# Patient Record
Sex: FEMALE | Race: WHITE | NOT HISPANIC OR LATINO | URBAN - METROPOLITAN AREA
[De-identification: names, ages, dates, MRNs, and addresses within clinical notes are randomized per-mention and may not be internally consistent; named-entity substitution may affect disease eponyms.]

---

## 2017-05-30 ENCOUNTER — HISTORICAL (OUTPATIENT)
Dept: LAB | Facility: HOSPITAL | Age: 47
End: 2017-05-30

## 2017-05-30 LAB
ABS NEUT (OLG): 4.3 X10(3)/MCL
ALBUMIN SERPL-MCNC: 3.8 GM/DL (ref 3.4–5)
ALBUMIN/GLOB SERPL: 1 {RATIO}
ALP SERPL-CCNC: 58 UNIT/L (ref 45–117)
ALT SERPL-CCNC: 36 UNIT/L (ref 13–56)
APTT PPP: 23 SECOND(S) (ref 21–30)
AST SERPL-CCNC: 16 UNIT/L (ref 15–37)
BASOPHILS # BLD AUTO: 0 X10(3)/MCL
BASOPHILS NFR BLD AUTO: 0.1 % (ref 0–1)
BILIRUB SERPL-MCNC: 0.3 MG/DL (ref 0.2–1)
BILIRUBIN DIRECT+TOT PNL SERPL-MCNC: <0.1 MG/DL (ref 0–0.2)
BILIRUBIN DIRECT+TOT PNL SERPL-MCNC: >0.2 MG/DL (ref 0–1)
BUN SERPL-MCNC: 12 MG/DL (ref 7–18)
CALCIUM SERPL-MCNC: 8.3 MG/DL (ref 8.5–10.1)
CHLORIDE SERPL-SCNC: 106 MMOL/L (ref 98–107)
CHOLEST SERPL-MCNC: 224 MG/DL (ref 0–200)
CHOLEST/HDLC SERPL: 4.1 {RATIO} (ref 0–4)
CO2 SERPL-SCNC: 27 MMOL/L (ref 21–32)
CREAT SERPL-MCNC: 0.52 MG/DL (ref 0.55–1.02)
EOSINOPHIL # BLD AUTO: 0.4 X10(3)/MCL
EOSINOPHIL NFR BLD AUTO: 5.3 % (ref 0–6.4)
ERYTHROCYTE [DISTWIDTH] IN BLOOD BY AUTOMATED COUNT: 13.8 % (ref 11.5–14.8)
EST. AVERAGE GLUCOSE BLD GHB EST-MCNC: 117 MG/DL
GLOBULIN SER-MCNC: 3 GM/DL (ref 2–4)
GLUCOSE SERPL-MCNC: 94 MG/DL (ref 74–106)
HBA1C MFR BLD: 5.7 % (ref 4.2–6.3)
HCT VFR BLD AUTO: 40.3 % (ref 35.5–44.6)
HDLC SERPL-MCNC: 55 MG/DL (ref 35–60)
HGB BLD-MCNC: 13.6 GM/DL (ref 12.1–15.4)
INR PPP: 0.9
LDLC SERPL CALC-MCNC: 128 MG/DL (ref 0–129)
LYMPHOCYTES # BLD AUTO: 2.5 X10(3)/MCL
LYMPHOCYTES NFR BLD AUTO: 32.2 % (ref 16–44)
MCH RBC QN AUTO: 30.4 PG (ref 28.5–33.8)
MCHC RBC AUTO-ENTMCNC: 33.7 % (ref 33–37)
MCV RBC AUTO: 90 FL (ref 82–99)
MONOCYTES # BLD AUTO: 0.5 X10(3)/MCL
MONOCYTES NFR BLD AUTO: 6.2 % (ref 4–12.1)
NEUTROPHILS # BLD AUTO: 4.3 X10(3)/MCL
NEUTROPHILS NFR BLD AUTO: 55.9 % (ref 43–73)
PLATELET # BLD AUTO: 282 X10(3)/MCL (ref 136–369)
PMV BLD AUTO: 9.5 FL (ref 7.4–10.4)
POTASSIUM SERPL-SCNC: 4.7 MMOL/L (ref 3.5–5.1)
PROT SERPL-MCNC: 7.1 GM/DL (ref 6.4–8.2)
PROTHROMBIN TIME: 9.6 SECOND(S) (ref 9.8–12)
RBC # BLD AUTO: 4.48 X10(6)/MCL (ref 4–5.5)
SODIUM SERPL-SCNC: 140 MMOL/L (ref 136–145)
TRIGL SERPL-MCNC: 203 MG/DL (ref 30–150)
VLDLC SERPL CALC-MCNC: 41 MG/DL
WBC # SPEC AUTO: 7.7 X10(3)/MCL (ref 4–10)

## 2018-01-31 ENCOUNTER — HISTORICAL (OUTPATIENT)
Dept: LAB | Facility: HOSPITAL | Age: 48
End: 2018-01-31

## 2018-01-31 LAB
ALBUMIN SERPL-MCNC: 4.1 GM/DL (ref 3.4–5)
ALBUMIN/GLOB SERPL: 1.2 {RATIO}
ALP SERPL-CCNC: 54 UNIT/L (ref 38–126)
ALT SERPL-CCNC: 38 UNIT/L (ref 12–78)
AST SERPL-CCNC: 17 UNIT/L (ref 15–37)
BILIRUB SERPL-MCNC: 0.6 MG/DL (ref 0.2–1)
BILIRUBIN DIRECT+TOT PNL SERPL-MCNC: 0.1 MG/DL (ref 0–0.2)
BILIRUBIN DIRECT+TOT PNL SERPL-MCNC: 0.5 MG/DL (ref 0–0.8)
BUN SERPL-MCNC: 13 MG/DL (ref 7–18)
CALCIUM SERPL-MCNC: 9.7 MG/DL (ref 8.5–10.1)
CHLORIDE SERPL-SCNC: 100 MMOL/L (ref 98–107)
CHOLEST SERPL-MCNC: 234 MG/DL (ref 0–200)
CHOLEST/HDLC SERPL: 5.1 {RATIO} (ref 0–4)
CO2 SERPL-SCNC: 30 MMOL/L (ref 21–32)
CREAT SERPL-MCNC: 0.53 MG/DL (ref 0.55–1.02)
EST. AVERAGE GLUCOSE BLD GHB EST-MCNC: 114 MG/DL
GLOBULIN SER-MCNC: 3.4 GM/DL (ref 2.4–3.5)
GLUCOSE SERPL-MCNC: 99 MG/DL (ref 74–106)
HBA1C MFR BLD: 5.6 % (ref 4.2–6.3)
HDLC SERPL-MCNC: 46 MG/DL (ref 35–60)
LDLC SERPL CALC-MCNC: 150 MG/DL (ref 0–129)
POTASSIUM SERPL-SCNC: 4.1 MMOL/L (ref 3.5–5.1)
PROT SERPL-MCNC: 7.5 GM/DL (ref 6.4–8.2)
SODIUM SERPL-SCNC: 138 MMOL/L (ref 136–145)
TRIGL SERPL-MCNC: 188 MG/DL (ref 30–150)
VLDLC SERPL CALC-MCNC: 38 MG/DL

## 2018-02-19 LAB
INFLUENZA A ANTIGEN, POC: NEGATIVE
INFLUENZA B ANTIGEN, POC: NEGATIVE

## 2018-03-22 LAB
HUMAN PAPILLOMAVIRUS (HPV): NORMAL
PAP RECOMMENDATION EXT: ABNORMAL
PAP SMEAR: ABNORMAL

## 2018-04-30 ENCOUNTER — HISTORICAL (OUTPATIENT)
Dept: LAB | Facility: HOSPITAL | Age: 48
End: 2018-04-30

## 2018-04-30 LAB
ABS NEUT (OLG): 5.48 X10(3)/MCL (ref 2.1–9.2)
ALBUMIN SERPL-MCNC: 4.4 GM/DL (ref 3.4–5)
ALBUMIN/GLOB SERPL: 1.3 RATIO (ref 1.1–2)
ALP SERPL-CCNC: 90 UNIT/L (ref 38–126)
ALT SERPL-CCNC: 38 UNIT/L (ref 12–78)
AST SERPL-CCNC: 20 UNIT/L (ref 15–37)
BASOPHILS # BLD AUTO: 0 X10(3)/MCL (ref 0–0.2)
BASOPHILS NFR BLD AUTO: 0 %
BILIRUB SERPL-MCNC: 0.8 MG/DL (ref 0.2–1)
BILIRUBIN DIRECT+TOT PNL SERPL-MCNC: 0.1 MG/DL (ref 0–0.5)
BILIRUBIN DIRECT+TOT PNL SERPL-MCNC: 0.7 MG/DL (ref 0–0.8)
BUN SERPL-MCNC: 16 MG/DL (ref 7–18)
CALCIUM SERPL-MCNC: 10 MG/DL (ref 8.5–10.1)
CHLORIDE SERPL-SCNC: 98 MMOL/L (ref 98–107)
CHOLEST SERPL-MCNC: 223 MG/DL (ref 0–200)
CHOLEST/HDLC SERPL: 5.3 {RATIO} (ref 0–4)
CO2 SERPL-SCNC: 25 MMOL/L (ref 21–32)
CREAT SERPL-MCNC: 0.52 MG/DL (ref 0.55–1.02)
EOSINOPHIL # BLD AUTO: 0.2 X10(3)/MCL (ref 0–0.9)
EOSINOPHIL NFR BLD AUTO: 2 %
ERYTHROCYTE [DISTWIDTH] IN BLOOD BY AUTOMATED COUNT: 13.4 % (ref 11.5–17)
GLOBULIN SER-MCNC: 3.5 GM/DL (ref 2.4–3.5)
GLUCOSE SERPL-MCNC: 96 MG/DL (ref 74–106)
HCT VFR BLD AUTO: 38.3 % (ref 37–47)
HDLC SERPL-MCNC: 42 MG/DL (ref 35–60)
HGB BLD-MCNC: 12.7 GM/DL (ref 12–16)
LDLC SERPL CALC-MCNC: 111 MG/DL (ref 0–129)
LYMPHOCYTES # BLD AUTO: 2.4 X10(3)/MCL (ref 0.6–4.6)
LYMPHOCYTES NFR BLD AUTO: 28 %
MCH RBC QN AUTO: 30 PG (ref 27–31)
MCHC RBC AUTO-ENTMCNC: 33.2 GM/DL (ref 33–36)
MCV RBC AUTO: 90.3 FL (ref 80–94)
MONOCYTES # BLD AUTO: 0.6 X10(3)/MCL (ref 0.1–1.3)
MONOCYTES NFR BLD AUTO: 6 %
NEUTROPHILS # BLD AUTO: 5.48 X10(3)/MCL (ref 1.4–7.9)
NEUTROPHILS NFR BLD AUTO: 63 %
PLATELET # BLD AUTO: 454 X10(3)/MCL (ref 130–400)
PMV BLD AUTO: 9.4 FL (ref 9.4–12.4)
POTASSIUM SERPL-SCNC: 3.7 MMOL/L (ref 3.5–5.1)
PROT SERPL-MCNC: 7.9 GM/DL (ref 6.4–8.2)
RBC # BLD AUTO: 4.24 X10(6)/MCL (ref 4.2–5.4)
SODIUM SERPL-SCNC: 136 MMOL/L (ref 136–145)
TRIGL SERPL-MCNC: 349 MG/DL (ref 30–150)
VLDLC SERPL CALC-MCNC: 70 MG/DL
WBC # SPEC AUTO: 8.7 X10(3)/MCL (ref 4.5–11.5)

## 2019-03-08 ENCOUNTER — HISTORICAL (OUTPATIENT)
Dept: ADMINISTRATIVE | Facility: HOSPITAL | Age: 49
End: 2019-03-08

## 2019-03-08 ENCOUNTER — HISTORICAL (OUTPATIENT)
Dept: HEMATOLOGY/ONCOLOGY | Facility: CLINIC | Age: 49
End: 2019-03-08

## 2019-03-08 LAB
ABS NEUT (OLG): 2.66 X10(3)/MCL (ref 2.1–9.2)
ALBUMIN SERPL-MCNC: 4.4 GM/DL (ref 3.4–5)
ALBUMIN/GLOB SERPL: 1.4 {RATIO}
ALP SERPL-CCNC: 53 UNIT/L (ref 38–126)
ALT SERPL-CCNC: 45 UNIT/L (ref 12–78)
AST SERPL-CCNC: 19 UNIT/L (ref 15–37)
BASOPHILS # BLD AUTO: 0 X10(3)/MCL (ref 0–0.2)
BASOPHILS NFR BLD AUTO: 0.2 %
BILIRUB SERPL-MCNC: 0.4 MG/DL (ref 0.2–1)
BILIRUBIN DIRECT+TOT PNL SERPL-MCNC: 0.1 MG/DL (ref 0–0.2)
BILIRUBIN DIRECT+TOT PNL SERPL-MCNC: 0.3 MG/DL (ref 0–0.8)
BUN SERPL-MCNC: 22 MG/DL (ref 7–18)
CALCIUM SERPL-MCNC: 8.9 MG/DL (ref 8.5–10.1)
CEA SERPL-MCNC: 1 NG/ML (ref 0–3)
CHLORIDE SERPL-SCNC: 101 MMOL/L (ref 98–107)
CHOLEST SERPL-MCNC: 222 MG/DL (ref 0–200)
CHOLEST/HDLC SERPL: 2.7 {RATIO} (ref 0–4)
CO2 SERPL-SCNC: 30 MMOL/L (ref 21–32)
CREAT SERPL-MCNC: 0.7 MG/DL (ref 0.55–1.02)
EOSINOPHIL # BLD AUTO: 0.2 X10(3)/MCL (ref 0–0.9)
EOSINOPHIL NFR BLD AUTO: 3.2 %
ERYTHROCYTE [DISTWIDTH] IN BLOOD BY AUTOMATED COUNT: 12.9 % (ref 11.5–17)
EST. AVERAGE GLUCOSE BLD GHB EST-MCNC: 120 MG/DL
GLOBULIN SER-MCNC: 3.1 GM/DL (ref 2.4–3.5)
GLUCOSE SERPL-MCNC: 104 MG/DL (ref 74–106)
HBA1C MFR BLD: 5.8 % (ref 4.2–6.3)
HCT VFR BLD AUTO: 41.3 % (ref 37–47)
HDLC SERPL-MCNC: 82 MG/DL (ref 35–60)
HGB BLD-MCNC: 13.7 GM/DL (ref 12–16)
LDLC SERPL CALC-MCNC: 128 MG/DL (ref 0–129)
LYMPHOCYTES # BLD AUTO: 1.8 X10(3)/MCL (ref 0.6–4.6)
LYMPHOCYTES NFR BLD AUTO: 35.4 %
MCH RBC QN AUTO: 30.4 PG (ref 27–31)
MCHC RBC AUTO-ENTMCNC: 33.2 GM/DL (ref 33–36)
MCV RBC AUTO: 91.6 FL (ref 80–94)
MONOCYTES # BLD AUTO: 0.4 X10(3)/MCL (ref 0.1–1.3)
MONOCYTES NFR BLD AUTO: 7.4 %
NEUTROPHILS # BLD AUTO: 2.7 X10(3)/MCL (ref 2.1–9.2)
NEUTROPHILS NFR BLD AUTO: 53.6 %
PLATELET # BLD AUTO: 268 X10(3)/MCL (ref 130–400)
PMV BLD AUTO: 9.2 FL (ref 9.4–12.4)
POTASSIUM SERPL-SCNC: 4.9 MMOL/L (ref 3.5–5.1)
PROT SERPL-MCNC: 7.5 GM/DL (ref 6.4–8.2)
RBC # BLD AUTO: 4.51 X10(6)/MCL (ref 4.2–5.4)
SODIUM SERPL-SCNC: 139 MMOL/L (ref 136–145)
TRIGL SERPL-MCNC: 62 MG/DL (ref 30–150)
VLDLC SERPL CALC-MCNC: 12 MG/DL
WBC # SPEC AUTO: 5 X10(3)/MCL (ref 4.5–11.5)

## 2021-05-19 ENCOUNTER — HISTORICAL (OUTPATIENT)
Dept: ADMINISTRATIVE | Facility: HOSPITAL | Age: 51
End: 2021-05-19

## 2021-05-20 LAB — GRAM STN SPEC: NORMAL

## 2021-05-24 LAB — FINAL CULTURE: NORMAL

## 2021-05-25 LAB — NONINV COLON CA DNA+OCC BLD SCRN STL QL: POSITIVE

## 2021-06-08 ENCOUNTER — HISTORICAL (OUTPATIENT)
Dept: ADMINISTRATIVE | Facility: HOSPITAL | Age: 51
End: 2021-06-08

## 2021-07-08 ENCOUNTER — HISTORICAL (OUTPATIENT)
Dept: ADMINISTRATIVE | Facility: HOSPITAL | Age: 51
End: 2021-07-08

## 2021-07-08 LAB
CRC RECOMMENDATION EXT: NORMAL
CRC RECOMMENDATION EXT: NORMAL
TSH SERPL-ACNC: 3.48 UIU/ML (ref 0.35–4.94)

## 2021-07-13 ENCOUNTER — HISTORICAL (OUTPATIENT)
Dept: RADIOLOGY | Facility: HOSPITAL | Age: 51
End: 2021-07-13

## 2021-07-16 ENCOUNTER — HISTORICAL (OUTPATIENT)
Dept: LAB | Facility: HOSPITAL | Age: 51
End: 2021-07-16

## 2021-07-16 LAB
ABS NEUT (OLG): 4.1 X10(3)/MCL (ref 2.1–9.2)
BASOPHILS # BLD AUTO: 0.02 X10(3)/MCL (ref 0–0.2)
BASOPHILS NFR BLD AUTO: 0.3 % (ref 0–1)
CRP SERPL HS-MCNC: 6 MG/L (ref 0–5)
EOSINOPHIL # BLD AUTO: 0.13 X10(3)/MCL (ref 0–0.9)
EOSINOPHIL NFR BLD AUTO: 2.2 % (ref 0–6.4)
ERYTHROCYTE [DISTWIDTH] IN BLOOD BY AUTOMATED COUNT: 13.8 % (ref 11.5–17)
ERYTHROCYTE [SEDIMENTATION RATE] IN BLOOD: 56 MM/HR (ref 0–30)
HCT VFR BLD AUTO: 28.3 % (ref 37–47)
HGB BLD-MCNC: 9.2 GM/DL (ref 12–16)
IMM GRANULOCYTES # BLD AUTO: 0.02 10*3/UL (ref 0–0.02)
IMM GRANULOCYTES NFR BLD AUTO: 0.3 % (ref 0–0.43)
LYMPHOCYTES # BLD AUTO: 1.3 X10(3)/MCL (ref 0.6–4.6)
LYMPHOCYTES NFR BLD AUTO: 22 % (ref 16–44)
MCH RBC QN AUTO: 28.9 PG (ref 27–31)
MCHC RBC AUTO-ENTMCNC: 32.5 GM/DL (ref 33–36)
MCV RBC AUTO: 89 FL (ref 80–94)
MONOCYTES # BLD AUTO: 0.35 X10(3)/MCL (ref 0.1–1.3)
MONOCYTES NFR BLD AUTO: 5.9 % (ref 4–12.1)
NEUTROPHILS # BLD AUTO: 4.1 X10(3)/MCL (ref 2.1–9.2)
NEUTROPHILS NFR BLD AUTO: 69.3 % (ref 43–73)
NRBC BLD AUTO-RTO: 0 % (ref 0–0.2)
PLATELET # BLD AUTO: 413 X10(3)/MCL (ref 130–400)
PMV BLD AUTO: 9 FL (ref 7.4–10.4)
RBC # BLD AUTO: 3.18 X10(6)/MCL (ref 4.2–5.4)
WBC # SPEC AUTO: 5.9 X10(3)/MCL (ref 4.5–11.5)

## 2022-04-10 ENCOUNTER — HISTORICAL (OUTPATIENT)
Dept: ADMINISTRATIVE | Facility: HOSPITAL | Age: 52
End: 2022-04-10
Payer: COMMERCIAL

## 2022-04-28 VITALS
SYSTOLIC BLOOD PRESSURE: 120 MMHG | WEIGHT: 171.94 LBS | BODY MASS INDEX: 28.65 KG/M2 | DIASTOLIC BLOOD PRESSURE: 70 MMHG | OXYGEN SATURATION: 97 % | HEIGHT: 65 IN

## 2022-05-04 NOTE — HISTORICAL [facility]
This is a historical note converted from Pallavi. Formatting and pictures may have been removed.  Please reference Pallavi for original formatting and attached multimedia. Chief Complaint  right elbow pain..no prior injury or surgery..ref by Poonam..Dr Levin drained it and put her on Clindamycin and sent lab work off..all done on 5-19-21..also had a fall about 2 weeks ago..  History of Present Illness  Pt presents today with right elbow pain that has been ongoing for a few months. She also reports that the pain increased after a fall that occurred x2 weeks ago. She was being seen by her PCP for this complaint. PCP has drained the right elbow bursa,?prescribed PO Clindamycin, and obtained negative labwork for RA. She reports that some of the pain and erythema has resolved, however the pain has continued to worsen.  Physical Exam  Vitals & Measurements  T:?97.5? ?F (Oral)? HR:?88(Apical)? BP:?125/82?  HT:?165.00?cm? WT:?86.950?kg? BMI:?31.94?  General: Well-developed, well-nourished.  Neuro: Alert and oriented x 3.  Psych: Normal mood and affect.  Right Elbow Exam:  No obvious deformity. Range of motion is 0 to 130 degrees. Negative varus and valgus stress test. Supination and pronation to 90 degrees. Negative tenderness to palpation over the lateral epicondyle. Negative tenderness to palpation over the medial epicondyle. Negative Tinel?s test. ?Positive olecranon tenderness. 4/5 strength, normal skin appearance and palpable pulses distally. Sensibility normal.  Assessment/Plan  1.?Olecranon bursitis of right elbow?M70.21  This patient has been suffering with edema, erythema, and pain to the right elbow for several months now with some acute worsening. Despite conservative therapy, she has had minimal improvement. I am concerned about infectious pathology and would like further evaluation of the joint via MRI. She will start wearing an elbow sleeve for comfort. We will see her to review MRI results as soon as they are  available.  Ordered:  diclofenac, 75 mg = 1 tab(s), Oral, BID, # 60 tab(s), 0 Refill(s), Pharmacy: [facility], 170.18, cm, Height/Length Dosing, Weight Dosing  CRP, Routine collect, *Est. 06/08/21 3:00:00 CDT, Blood, Order for future visit, *Est. Stop date 06/08/21 3:00:00 CDT, Lab Collect, Olecranon bursitis of right elbow, 06/08/21 10:34:00 CDT  MRI Ext Upper Joint Right W/O Contrast, Routine, 06/08/21 10:34:00 CDT, Other (please specify), Elbow pain, chronic, bursitis suspected, nondiagnostic xray, None, Stretcher, Rad Type, Order for future visit, Olecranon bursitis of right elbow, Schedule this test, [facility]...  Office/Outpatient Visit Level 4 New [id number] PC, Olecranon bursitis of right elbow, Orthopa[facility]ics Clinic, 06/08/21 10:41:00 CDT  Sedimentation Rate, Routine collect, *Est. 06/08/21 3:00:00 CDT, Blood, Order for future visit, *Est. Stop date 06/08/21 3:00:00 CDT, Lab Collect, Olecranon bursitis of right elbow, 06/08/21 10:34:00 CDT  ?  Orders:  XR Elbow Right Minimum 3 Views, Routine, 06/08/21 10:06:00 CDT, None, Stretcher, Rad Type, Right elbow pain, Not Scheduled, 06/08/21 10:06:00 CDT   Problem List/Past Medical History  Ongoing  Colon cancer screening  Family history of diabetes mellitus  H/O bilateral mastectomy  Hyperlipidemia  Hypertension  Malignant neoplasm of central portion of left female breast  Obesity  Olecranon bursitis of right elbow  Paresthesias  Seasonal allergies  Tobacco user  Wellness examination  Historical  Breast cancer  Tobacco user  Procedure/Surgical History  Hysterectomy (04/20/2018)  Breast implantation (11/07/2017)  Bilateral extended simple mastectomy (07/13/2015)  Bilateral simple mastectomy (07/13/2015)  Biopsy Sentinal Node (Left) (07/13/2015)  Mastectomy Radical (Bilateral) (07/13/2015)  Mastectomy, modified radical, including axillary lymph nodes, with or without pectoralis minor muscle, but excluding pectoralis major muscle (07/13/2015)  breast  biopsy (06/19/2015)  Tubal ligation (06/15/1992)  Tonsillectomy   Medications  hydrochlorothiazide 25 mg oral tablet, See Instructions, 3 refills  losartan 25 mg oral tablet, See Instructions, 1 refills  Mobic 15 mg oral tablet, 15 mg= 1 tab(s), Oral, Daily, PRN, 3 refills,? ?Not Taking, Completed Rx: Last Dose Date/Time Unknown  paroxetine 10 mg oral tablet, See Instructions  Pravastatin 40 mg Oral Tab, 40 mg= 1 tab(s), Oral, Daily, 3 refills  Allergies  Darvocet A500  HYDROcodone  Keflex  Social History  Abuse/Neglect  No, 06/08/2021  Alcohol - Medium Risk, 09/03/2015  Current, Beer, 1-2 times per month, Alcohol use interferes with work or home: No. Drinks more than intended: No. Others hurt by drinking: No., 06/15/2015  Employment/School - No Risk, 09/03/2015  Employed, Unemployed, Work/School description: sales/ customer service. Highest education level: University degree(s)., 05/30/2017  Exercise - Regular exercise, 09/03/2015  Exercise duration: 30. Exercise frequency: 3-4 times/week. Self assessment: Excellent condition. Exercise type: Walking, BIKE RIDING., 05/30/2017  Home/Environment - No Risk, 09/03/2015  Lives with Significant other. Living situation: Home/Independent. Alcohol abuse in household: No. Substance abuse in household: No. Smoker in household: Yes. Feels unsafe at home: No. Safe place to go: Yes. Family/Friends available for support: Yes. Major illness in household: No., 06/15/2015  Nutrition/Health - No Risk, 09/03/2015  Regular, Caffeine intake amount: COFFEE 4-5 CUPS PER MORNING. Sleeping concerns: Yes. Feels highly stressed: Yes., 06/18/2015  Sexual - No Risk, 09/03/2015  Substance Use - Denies Substance Abuse, 06/10/2015  Never, 05/30/2017  Tobacco - High Risk, 06/10/2015  Former smoker, quit more than 30 days ago, No, 06/08/2021  Family History  Breast cancer: Sister.  Diabetes mellitus type 2: Mother, Father and Sister.  Metastatic cancer: Sister.  Primary malignant neoplasm of breast:  Sister.  Immunizations  Vaccine Date Status   COVID-19 MRNA, LNP-S, PF- Pfizer 05/27/2021 Recorded   COVID-19 MRNA, LNP-S, PF- Pfizer 05/06/2021 Recorded   zoster vaccine, inactivated 12/16/2020 Recorded   zoster vaccine, inactivated 08/31/2020 Recorded   Health Maintenance  Health Maintenance  ???Pending?(in the next year)  ??? ??OverDue  ??? ? ? ?Diabetes Screening due??03/07/20??and every 1??year(s)  ??? ? ? ?Hypertension Management-BMP due??03/07/20??and every 1??year(s)  ??? ? ? ?Diabetes Maintenance-Fasting Lipid Profile due??03/08/20??and every 1??year(s)  ??? ? ? ?Influenza Vaccine due??10/01/20??and every 1??day(s)  ??? ??Due?  ??? ? ? ?Tetanus Vaccine due??06/08/21??and every 10??year(s)  ??? ??Due In Future?  ??? ? ? ?Obesity Screening not due until??01/01/22??and every 1??year(s)  ??? ? ? ?Smoking Cessation not due until??01/01/22??and every 1??year(s)  ??? ? ? ?Alcohol Misuse Screening not due until??01/02/22??and every 1??year(s)  ??? ? ? ?Depression Screening not due until??05/19/22??and every 1??year(s)  ??? ? ? ?ADL Screening not due until??05/19/22??and every 1??year(s)  ??? ? ? ?Hypertension Management-Education not due until??05/19/22??and every 1??year(s)  ???Satisfied?(in the past 1 year)  ??? ??Satisfied?  ??? ? ? ?ADL Screening on??05/19/21.??Satisfied by Catie Vargas  ??? ? ? ?Alcohol Misuse Screening on??05/19/21.??Satisfied by Valsin, Catie M.  ??? ? ? ?Blood Pressure Screening on??06/08/21.??Satisfied by Arabella Bradford LPN  ??? ? ? ?Body Mass Index Check on??06/08/21.??Satisfied by Arabella Bradford LPN  ??? ? ? ?Colorectal Screening on??06/02/21.??Satisfied by Catie Vargas  ??? ? ? ?Depression Screening on??05/19/21.??Satisfied by Catie Vargas  ??? ? ? ?Hypertension Management-Blood Pressure on??06/08/21.??Satisfied by Arabella Bradford LPN  ??? ? ? ?Influenza Vaccine on??03/23/21.??Satisfied by Marita Freitas  ??? ? ? ?Obesity Screening on??06/08/21.??Satisfied by  Arabella Bradford LPN  ??? ? ? ?Smoking Cessation on??06/08/21.??Satisfied by Arabella Bradford LPN  ?  Diagnostic Results  XR of right elbow demonstrates no acute osseous pathology

## 2022-08-18 ENCOUNTER — TELEPHONE (OUTPATIENT)
Dept: FAMILY MEDICINE | Facility: CLINIC | Age: 52
End: 2022-08-18
Payer: COMMERCIAL

## 2022-08-18 DIAGNOSIS — E66.9 OBESITY, UNSPECIFIED CLASSIFICATION, UNSPECIFIED OBESITY TYPE, UNSPECIFIED WHETHER SERIOUS COMORBIDITY PRESENT: ICD-10-CM

## 2022-08-18 DIAGNOSIS — Z00.00 WELLNESS EXAMINATION: Primary | ICD-10-CM

## 2022-08-18 DIAGNOSIS — I10 HYPERTENSION, UNSPECIFIED TYPE: ICD-10-CM

## 2022-08-31 RX ORDER — LOSARTAN POTASSIUM 25 MG/1
25 TABLET ORAL DAILY
COMMUNITY
Start: 2022-05-27 | End: 2022-09-01 | Stop reason: SDUPTHER

## 2022-08-31 RX ORDER — PRAVASTATIN SODIUM 40 MG/1
40 TABLET ORAL DAILY
COMMUNITY
Start: 2022-05-27 | End: 2022-09-01 | Stop reason: SDUPTHER

## 2022-08-31 NOTE — TELEPHONE ENCOUNTER
----- Message from Maribel Woo sent at 8/31/2022  2:01 PM CDT -----  Regarding: Meds  .Type:  Pharmacy Calling to Clarify an RX    Name of Caller:Isabelle  Pharmacy Name:[facility]  Prescription Name:Losartan and Pravastin   What do they need to clarify?:  Best Call Back Number:[phone]  Additional Information: Faxed over refill request for Losartan and Pravastin and never heard anything, meds are not listed in chart, been faxed since 8/24..

## 2022-09-01 RX ORDER — LOSARTAN POTASSIUM 25 MG/1
25 TABLET ORAL DAILY
Qty: 90 TABLET | Status: CANCELLED | OUTPATIENT
Start: 2022-09-01

## 2022-09-01 RX ORDER — PRAVASTATIN SODIUM 40 MG/1
40 TABLET ORAL DAILY
Qty: 90 TABLET | Status: CANCELLED | OUTPATIENT
Start: 2022-09-01

## 2022-09-01 RX ORDER — LOSARTAN POTASSIUM 25 MG/1
25 TABLET ORAL DAILY
Qty: 90 TABLET | Refills: 0 | Status: SHIPPED | OUTPATIENT
Start: 2022-09-01 | End: 2022-10-25 | Stop reason: SDUPTHER

## 2022-09-01 RX ORDER — PRAVASTATIN SODIUM 40 MG/1
40 TABLET ORAL DAILY
Qty: 90 TABLET | Refills: 0 | Status: SHIPPED | OUTPATIENT
Start: 2022-09-01 | End: 2022-10-25 | Stop reason: SDUPTHER

## 2022-09-01 NOTE — TELEPHONE ENCOUNTER
I spoke with Cara at [facility] and they requesting refill on losartan and pravastatin. I advised her it would be sent. They will let the patient know once it is sent. Patient requesting a #90 day supply. Please advise. Joseph

## 2022-09-21 ENCOUNTER — HISTORICAL (OUTPATIENT)
Dept: ADMINISTRATIVE | Facility: HOSPITAL | Age: 52
End: 2022-09-21
Payer: COMMERCIAL

## 2022-10-25 ENCOUNTER — TELEPHONE (OUTPATIENT)
Dept: FAMILY MEDICINE | Facility: CLINIC | Age: 52
End: 2022-10-25

## 2022-10-25 ENCOUNTER — OFFICE VISIT (OUTPATIENT)
Dept: FAMILY MEDICINE | Facility: CLINIC | Age: 52
End: 2022-10-25
Payer: COMMERCIAL

## 2022-10-25 VITALS
TEMPERATURE: 98 F | RESPIRATION RATE: 16 BRPM | DIASTOLIC BLOOD PRESSURE: 84 MMHG | OXYGEN SATURATION: 98 % | BODY MASS INDEX: 34.68 KG/M2 | HEART RATE: 67 BPM | SYSTOLIC BLOOD PRESSURE: 138 MMHG | WEIGHT: 203.13 LBS | HEIGHT: 64 IN

## 2022-10-25 DIAGNOSIS — K25.9 GASTRIC ULCER, UNSPECIFIED CHRONICITY, UNSPECIFIED WHETHER GASTRIC ULCER HEMORRHAGE OR PERFORATION PRESENT: ICD-10-CM

## 2022-10-25 DIAGNOSIS — Z83.3 FAMILY HISTORY OF DIABETES MELLITUS: ICD-10-CM

## 2022-10-25 DIAGNOSIS — Z00.00 WELLNESS EXAMINATION: Primary | ICD-10-CM

## 2022-10-25 DIAGNOSIS — Z90.13 H/O BILATERAL MASTECTOMY: ICD-10-CM

## 2022-10-25 DIAGNOSIS — E78.5 HYPERLIPIDEMIA, UNSPECIFIED HYPERLIPIDEMIA TYPE: ICD-10-CM

## 2022-10-25 DIAGNOSIS — E66.9 OBESITY, UNSPECIFIED CLASSIFICATION, UNSPECIFIED OBESITY TYPE, UNSPECIFIED WHETHER SERIOUS COMORBIDITY PRESENT: ICD-10-CM

## 2022-10-25 DIAGNOSIS — Z85.3 HISTORY OF BREAST CANCER: ICD-10-CM

## 2022-10-25 DIAGNOSIS — I10 HYPERTENSION, UNSPECIFIED TYPE: ICD-10-CM

## 2022-10-25 PROBLEM — Z12.11 COLON CANCER SCREENING: Status: ACTIVE | Noted: 2022-10-25

## 2022-10-25 PROBLEM — Z72.0 TOBACCO USER: Status: ACTIVE | Noted: 2022-10-25

## 2022-10-25 PROBLEM — J30.2 SEASONAL ALLERGIC RHINITIS: Status: ACTIVE | Noted: 2022-10-25

## 2022-10-25 PROBLEM — G47.00 INSOMNIA: Status: ACTIVE | Noted: 2022-10-25

## 2022-10-25 PROCEDURE — 3075F SYST BP GE 130 - 139MM HG: CPT | Mod: CPTII,,, | Performed by: FAMILY MEDICINE

## 2022-10-25 PROCEDURE — 99396 PR PREVENTIVE VISIT,EST,40-64: ICD-10-PCS | Mod: 25,,, | Performed by: FAMILY MEDICINE

## 2022-10-25 PROCEDURE — 4010F ACE/ARB THERAPY RXD/TAKEN: CPT | Mod: CPTII,,, | Performed by: FAMILY MEDICINE

## 2022-10-25 PROCEDURE — 3079F DIAST BP 80-89 MM HG: CPT | Mod: CPTII,,, | Performed by: FAMILY MEDICINE

## 2022-10-25 PROCEDURE — 1159F MED LIST DOCD IN RCRD: CPT | Mod: CPTII,,, | Performed by: FAMILY MEDICINE

## 2022-10-25 PROCEDURE — 1160F PR REVIEW ALL MEDS BY PRESCRIBER/CLIN PHARMACIST DOCUMENTED: ICD-10-PCS | Mod: CPTII,,, | Performed by: FAMILY MEDICINE

## 2022-10-25 PROCEDURE — 1160F RVW MEDS BY RX/DR IN RCRD: CPT | Mod: CPTII,,, | Performed by: FAMILY MEDICINE

## 2022-10-25 PROCEDURE — 99396 PREV VISIT EST AGE 40-64: CPT | Mod: 25,,, | Performed by: FAMILY MEDICINE

## 2022-10-25 PROCEDURE — 3075F PR MOST RECENT SYSTOLIC BLOOD PRESS GE 130-139MM HG: ICD-10-PCS | Mod: CPTII,,, | Performed by: FAMILY MEDICINE

## 2022-10-25 PROCEDURE — 3079F PR MOST RECENT DIASTOLIC BLOOD PRESSURE 80-89 MM HG: ICD-10-PCS | Mod: CPTII,,, | Performed by: FAMILY MEDICINE

## 2022-10-25 PROCEDURE — 1159F PR MEDICATION LIST DOCUMENTED IN MEDICAL RECORD: ICD-10-PCS | Mod: CPTII,,, | Performed by: FAMILY MEDICINE

## 2022-10-25 PROCEDURE — 4010F PR ACE/ARB THEARPY RXD/TAKEN: ICD-10-PCS | Mod: CPTII,,, | Performed by: FAMILY MEDICINE

## 2022-10-25 RX ORDER — LOSARTAN POTASSIUM 25 MG/1
25 TABLET ORAL DAILY
Qty: 90 TABLET | Refills: 3 | Status: SHIPPED | OUTPATIENT
Start: 2022-10-25 | End: 2023-12-15 | Stop reason: SDUPTHER

## 2022-10-25 RX ORDER — PANTOPRAZOLE SODIUM 40 MG/1
40 TABLET, DELAYED RELEASE ORAL DAILY
Qty: 90 TABLET | Refills: 3 | Status: SHIPPED | OUTPATIENT
Start: 2022-10-25 | End: 2022-12-28

## 2022-10-25 RX ORDER — HYDROCHLOROTHIAZIDE 25 MG/1
25 TABLET ORAL DAILY
COMMUNITY
Start: 2022-09-16 | End: 2022-10-25 | Stop reason: SDUPTHER

## 2022-10-25 RX ORDER — PHENTERMINE HYDROCHLORIDE 37.5 MG/1
37.5 TABLET ORAL
Qty: 30 TABLET | Refills: 0 | Status: SHIPPED | OUTPATIENT
Start: 2022-10-25 | End: 2022-11-24

## 2022-10-25 RX ORDER — PAROXETINE HYDROCHLORIDE 20 MG/1
20 TABLET, FILM COATED ORAL DAILY
COMMUNITY
Start: 2022-09-20 | End: 2022-10-25 | Stop reason: SDUPTHER

## 2022-10-25 RX ORDER — PAROXETINE HYDROCHLORIDE 20 MG/1
20 TABLET, FILM COATED ORAL DAILY
Qty: 90 TABLET | Refills: 3 | Status: SHIPPED | OUTPATIENT
Start: 2022-10-25 | End: 2023-07-05 | Stop reason: SDUPTHER

## 2022-10-25 RX ORDER — PRAVASTATIN SODIUM 40 MG/1
40 TABLET ORAL DAILY
Qty: 90 TABLET | Refills: 3 | Status: SHIPPED | OUTPATIENT
Start: 2022-10-25 | End: 2023-01-23

## 2022-10-25 RX ORDER — PANTOPRAZOLE SODIUM 40 MG/1
40 TABLET, DELAYED RELEASE ORAL
COMMUNITY
Start: 2021-08-10 | End: 2022-10-25 | Stop reason: SDUPTHER

## 2022-10-25 RX ORDER — HYDROCHLOROTHIAZIDE 25 MG/1
25 TABLET ORAL DAILY
Qty: 90 TABLET | Refills: 3 | Status: SHIPPED | OUTPATIENT
Start: 2022-10-25 | End: 2023-07-05 | Stop reason: SDUPTHER

## 2022-10-25 NOTE — ASSESSMENT & PLAN NOTE
Will try adipex. Take as directed.  Reminded patient this is a short term prescription to be used in conjunction with diet and exercise.  She is to monitor her bp at home. She will be seen monthly while on meds. Patient is agreeable to plan and verbalized understanding

## 2022-10-25 NOTE — ASSESSMENT & PLAN NOTE
Fasting labs ordered. cea added per request. Will call with results when available  History of bilateral mastectomy and hysterectomy with bso  Colonoscopy 7/2021 with dr. lg loco  Declines immunizations today

## 2022-10-25 NOTE — PROGRESS NOTES
Subjective:        Patient ID: Cookie Membreno is a 52 y.o. female.    Chief Complaint: Annual Exam (Wellness/Patient requesting breast cancer tumor marker blood test to be ordered. Understands may not be covered. Patient declines flu shot today. Patient would also like rx for omeprazole )      Patient presents to clinic unaccompanied for her wellness visit. She is due for labs.     She is obese.  Has gained weight.  Her stepson is getting  in 4/2023 and would like to lose weight before then.  She was previously on belviq which worked but was too expensive.     She has a history of HTN and HLD.  Lisinopril caused cough.  Monitors her bp at home.     She has personal history of breast cancer dx 2015.  She had bilateral mastectomy and complete hysterectomy and bso.  She was on tamoxifen and had breast reconstruction.  After her hyst, dr. Salazar prescribed paxil for her hot flashes.  She follows with dr. Patino.    She reported increased anxiety/depression/stress/insomnia at her visit 2/2022.  Her older sister who had breast cancer had recently passed and a younger brother was dx with sarcoidosis.  So we increased paxil to 20mg daily. Xanax was sent in prn panic attacks.     She had a positive cologuard 6/2021 and had subsequent colonoscopy 7/2021 with dr. Asher loco    Had infected olecranon bursa.  Required surgery.  Doing much better now.     She drinks alcohol on occasion.  She declines immunizations    Review of Systems   Constitutional:  Positive for unexpected weight change.   HENT: Negative.     Respiratory: Negative.     Cardiovascular: Negative.    Gastrointestinal: Negative.    Genitourinary: Negative.        Review of patient's allergies indicates:   Allergen Reactions    Cephalexin Hives, Itching and Shortness Of Breath    Hydrocodone     Propoxyphene n-acetaminophen       Vitals:    10/25/22 1603   BP: 138/84   BP Location: Left arm   Pulse: 67   Resp: 16   Temp: 97.9 °F (36.6 °C)  "  SpO2: 98%   Weight: 92.1 kg (203 lb 1.6 oz)   Height: 5' 4" (1.626 m)      Social History     Socioeconomic History    Marital status:    Tobacco Use    Smoking status: Former     Types: Cigarettes    Smokeless tobacco: Never   Substance and Sexual Activity    Alcohol use: Not Currently    Drug use: Never    Sexual activity: Yes      History reviewed. No pertinent family history.       Objective:     Physical Exam  Vitals and nursing note reviewed.   Constitutional:       Appearance: Normal appearance. She is obese.   HENT:      Head: Normocephalic and atraumatic.      Nose: Nose normal.      Mouth/Throat:      Mouth: Mucous membranes are moist.      Pharynx: Oropharynx is clear.   Eyes:      Extraocular Movements: Extraocular movements intact.   Cardiovascular:      Rate and Rhythm: Normal rate and regular rhythm.      Pulses: Normal pulses.      Heart sounds: Normal heart sounds.   Pulmonary:      Effort: Pulmonary effort is normal.      Breath sounds: Normal breath sounds.   Musculoskeletal:         General: Normal range of motion.      Cervical back: Normal range of motion.   Skin:     General: Skin is warm and dry.   Neurological:      General: No focal deficit present.      Mental Status: She is alert and oriented to person, place, and time. Mental status is at baseline.   Psychiatric:         Mood and Affect: Mood normal.     Current Outpatient Medications on File Prior to Visit   Medication Sig Dispense Refill    [DISCONTINUED] hydroCHLOROthiazide (HYDRODIURIL) 25 MG tablet Take 25 mg by mouth once daily.      [DISCONTINUED] losartan (COZAAR) 25 MG tablet Take 1 tablet (25 mg total) by mouth once daily. 90 tablet 0    [DISCONTINUED] paroxetine (PAXIL) 20 MG tablet Take 20 mg by mouth once daily.      [DISCONTINUED] pravastatin (PRAVACHOL) 40 MG tablet Take 1 tablet (40 mg total) by mouth once daily. 90 tablet 0    [DISCONTINUED] pantoprazole (PROTONIX) 40 MG tablet Take 40 mg by mouth.       No " current facility-administered medications on file prior to visit.     Health Maintenance   Topic Date Due    Hepatitis C Screening  Never done    TETANUS VACCINE  10/25/2023 (Originally 3/27/1988)    Lipid Panel  03/08/2024      Results for orders placed or performed in visit on 09/21/22   POCT Influenza A/B Molecular   Result Value Ref Range    Inflenza A Ag Negative     Influenza B Ag Negative           Assessment & Plan:     Active Problem List with Overview Notes    Diagnosis Date Noted    Wellness examination 10/25/2022    History of breast cancer 10/25/2022    H/O bilateral mastectomy 10/25/2022    Family history of diabetes mellitus 10/25/2022    Colon cancer screening 10/25/2022    Hyperlipidemia 10/25/2022    Hypertension 10/25/2022    Insomnia 10/25/2022    Obesity 10/25/2022    Seasonal allergic rhinitis 10/25/2022    Tobacco user 10/25/2022    Stomach ulcer        1. Wellness examination  Assessment & Plan:  Fasting labs ordered. cea added per request. Will call with results when available  History of bilateral mastectomy and hysterectomy with bso  Colonoscopy 7/2021 with dr. lg loco  Declines immunizations today        Orders:  -     HIV 1/2 Ag/Ab (4th Gen); Future; Expected date: 10/25/2022  -     Hepatitis C Antibody; Future; Expected date: 10/25/2022  -     CEA (in-house); Future; Expected date: 10/25/2022  -     CBC Auto Differential; Future; Expected date: 10/25/2022  -     Comprehensive Metabolic Panel; Future; Expected date: 10/25/2022  -     Lipid Panel; Future; Expected date: 10/25/2022  -     TSH; Future; Expected date: 10/25/2022  -     Hemoglobin A1C; Future; Expected date: 10/25/2022  -     Urinalysis; Future; Expected date: 10/25/2022    2. Hypertension, unspecified type  Assessment & Plan:  Well controlled on current meds. Not on asa due to GI    Orders:  -     HIV 1/2 Ag/Ab (4th Gen); Future; Expected date: 10/25/2022  -     Hepatitis C Antibody; Future; Expected date: 10/25/2022  -      CEA (in-house); Future; Expected date: 10/25/2022  -     CBC Auto Differential; Future; Expected date: 10/25/2022  -     Comprehensive Metabolic Panel; Future; Expected date: 10/25/2022  -     Lipid Panel; Future; Expected date: 10/25/2022  -     TSH; Future; Expected date: 10/25/2022  -     Hemoglobin A1C; Future; Expected date: 10/25/2022  -     Urinalysis; Future; Expected date: 10/25/2022    3. Hyperlipidemia, unspecified hyperlipidemia type  Assessment & Plan:  On statin    Orders:  -     HIV 1/2 Ag/Ab (4th Gen); Future; Expected date: 10/25/2022  -     Hepatitis C Antibody; Future; Expected date: 10/25/2022  -     CEA (in-house); Future; Expected date: 10/25/2022  -     CBC Auto Differential; Future; Expected date: 10/25/2022  -     Comprehensive Metabolic Panel; Future; Expected date: 10/25/2022  -     Lipid Panel; Future; Expected date: 10/25/2022  -     TSH; Future; Expected date: 10/25/2022  -     Hemoglobin A1C; Future; Expected date: 10/25/2022  -     Urinalysis; Future; Expected date: 10/25/2022    4. Obesity, unspecified classification, unspecified obesity type, unspecified whether serious comorbidity present  Assessment & Plan:  Will try adipex. Take as directed.  Reminded patient this is a short term prescription to be used in conjunction with diet and exercise.  She is to monitor her bp at home. She will be seen monthly while on meds. Patient is agreeable to plan and verbalized understanding    Orders:  -     HIV 1/2 Ag/Ab (4th Gen); Future; Expected date: 10/25/2022  -     Hepatitis C Antibody; Future; Expected date: 10/25/2022  -     CEA (in-house); Future; Expected date: 10/25/2022  -     CBC Auto Differential; Future; Expected date: 10/25/2022  -     Comprehensive Metabolic Panel; Future; Expected date: 10/25/2022  -     Lipid Panel; Future; Expected date: 10/25/2022  -     TSH; Future; Expected date: 10/25/2022  -     Hemoglobin A1C; Future; Expected date: 10/25/2022  -     Urinalysis; Future;  Expected date: 10/25/2022    5. History of breast cancer  Assessment & Plan:  S/p bilateral mastectomy with reconstruction and complete hysterectomy and BSO 2015    Orders:  -     HIV 1/2 Ag/Ab (4th Gen); Future; Expected date: 10/25/2022  -     Hepatitis C Antibody; Future; Expected date: 10/25/2022  -     CEA (in-house); Future; Expected date: 10/25/2022  -     CBC Auto Differential; Future; Expected date: 10/25/2022  -     Comprehensive Metabolic Panel; Future; Expected date: 10/25/2022  -     Lipid Panel; Future; Expected date: 10/25/2022  -     TSH; Future; Expected date: 10/25/2022  -     Hemoglobin A1C; Future; Expected date: 10/25/2022  -     Urinalysis; Future; Expected date: 10/25/2022    6. Family history of diabetes mellitus  Assessment & Plan:  a1c added to wellness labs    Orders:  -     HIV 1/2 Ag/Ab (4th Gen); Future; Expected date: 10/25/2022  -     Hepatitis C Antibody; Future; Expected date: 10/25/2022  -     CEA (in-house); Future; Expected date: 10/25/2022  -     CBC Auto Differential; Future; Expected date: 10/25/2022  -     Comprehensive Metabolic Panel; Future; Expected date: 10/25/2022  -     Lipid Panel; Future; Expected date: 10/25/2022  -     TSH; Future; Expected date: 10/25/2022  -     Hemoglobin A1C; Future; Expected date: 10/25/2022  -     Urinalysis; Future; Expected date: 10/25/2022    7. Gastric ulcer, unspecified chronicity, unspecified whether gastric ulcer hemorrhage or perforation present  Assessment & Plan:  Stable on ppi      8. H/O bilateral mastectomy  Assessment & Plan:  S/p bilateral mastectomy with reconstruction and complete hysterectomy and BSO 2015        Other orders  -     phentermine (ADIPEX-P) 37.5 mg tablet; Take 1 tablet (37.5 mg total) by mouth before breakfast.  Dispense: 30 tablet; Refill: 0  -     pantoprazole (PROTONIX) 40 MG tablet; Take 1 tablet (40 mg total) by mouth once daily.  Dispense: 90 tablet; Refill: 3  -     hydroCHLOROthiazide (HYDRODIURIL) 25  MG tablet; Take 1 tablet (25 mg total) by mouth once daily.  Dispense: 90 tablet; Refill: 3  -     losartan (COZAAR) 25 MG tablet; Take 1 tablet (25 mg total) by mouth once daily.  Dispense: 90 tablet; Refill: 3  -     paroxetine (PAXIL) 20 MG tablet; Take 1 tablet (20 mg total) by mouth once daily.  Dispense: 90 tablet; Refill: 3  -     pravastatin (PRAVACHOL) 40 MG tablet; Take 1 tablet (40 mg total) by mouth once daily.  Dispense: 90 tablet; Refill: 3       Follow up in about 4 weeks (around 11/22/2022) for obesity.

## 2022-11-01 ENCOUNTER — TELEPHONE (OUTPATIENT)
Dept: FAMILY MEDICINE | Facility: CLINIC | Age: 52
End: 2022-11-01
Payer: COMMERCIAL

## 2022-11-01 DIAGNOSIS — Z85.3 HISTORY OF BREAST CANCER: Primary | ICD-10-CM

## 2022-11-01 DIAGNOSIS — Z00.00 WELLNESS EXAMINATION: ICD-10-CM

## 2022-11-01 NOTE — TELEPHONE ENCOUNTER
I added the cea as we discussed at her appt. This was the only additional test that dr. Patino had ordered at her last labs with him 3/2019.  I will place order again if needed      Orders Placed This Encounter   Procedures    CEA (in-house)     Standing Status:   Future     Standing Expiration Date:   12/31/2023           Was there another test she wanted ordered? Did [facility] not get the order?

## 2022-11-01 NOTE — TELEPHONE ENCOUNTER
----- Message from Yeny Workman LPN sent at 11/1/2022  9:54 AM CDT -----  Regarding: FW: call [facility]cci[facility]  Patient said she needs orders for lab work for tumor markers as discussed at most recent office visit. [facility] is holding her blood until we send orders. Please advise on orders. Thank you  ----- Message -----  From: Melinda Coleman  Sent: 11/1/2022   9:51 AM CDT  To: [facility] Staff  Subject: call fredi                                        .Type:  Needs Medical Advice    Who Called: andrea  Would the patient rather a call back or a response via [facility]?   Best Call Back Number: [phone]  Additional Information:Pt said she is missing some labs she is currently at lab [facility] the fax number in [place] is [phone]. When done pls call back pt.

## 2022-11-02 LAB
ALBUMIN SERPL-MCNC: 5.2 G/DL (ref 3.8–4.9)
ALBUMIN/GLOB SERPL: 2.6 {RATIO} (ref 1.2–2.2)
ALP SERPL-CCNC: 70 IU/L (ref 44–121)
ALT SERPL-CCNC: 23 IU/L (ref 0–32)
APPEARANCE UR: CLEAR
AST SERPL-CCNC: 19 IU/L (ref 0–40)
BACTERIA #/AREA URNS HPF: NORMAL /[HPF]
BASOPHILS # BLD AUTO: 0 X10E3/UL (ref 0–0.2)
BASOPHILS NFR BLD AUTO: 1 %
BILIRUB SERPL-MCNC: 0.3 MG/DL (ref 0–1.2)
BILIRUB UR QL STRIP: NEGATIVE
BUN SERPL-MCNC: 14 MG/DL (ref 6–24)
BUN/CREAT SERPL: 23 (ref 9–23)
CALCIUM SERPL-MCNC: 9.7 MG/DL (ref 8.7–10.2)
CEA SERPL-MCNC: 1.6 NG/ML (ref 0–4.7)
CHLORIDE SERPL-SCNC: 96 MMOL/L (ref 96–106)
CHOLEST SERPL-MCNC: 231 MG/DL (ref 100–199)
CO2 SERPL-SCNC: 26 MMOL/L (ref 20–29)
COLOR UR: YELLOW
CREAT SERPL-MCNC: 0.61 MG/DL (ref 0.57–1)
CRYSTALS URNS MICRO: NORMAL
EOSINOPHIL # BLD AUTO: 0.1 X10E3/UL (ref 0–0.4)
EOSINOPHIL NFR BLD AUTO: 3 %
EPI CELLS #/AREA URNS HPF: NORMAL /HPF (ref 0–10)
ERYTHROCYTE [DISTWIDTH] IN BLOOD BY AUTOMATED COUNT: 13.6 % (ref 11.7–15.4)
EST. GFR  (NO RACE VARIABLE): 107 ML/MIN/1.73
GLOBULIN SER CALC-MCNC: 2 G/DL (ref 1.5–4.5)
GLUCOSE SERPL-MCNC: 117 MG/DL (ref 70–99)
GLUCOSE UR QL STRIP: NEGATIVE
HBA1C MFR BLD: 6.3 % (ref 4.8–5.6)
HCT VFR BLD AUTO: 41.6 % (ref 34–46.6)
HCV AB S/CO SERPL IA: <0.1 S/CO RATIO (ref 0–0.9)
HDLC SERPL-MCNC: 54 MG/DL
HGB BLD-MCNC: 13.9 G/DL (ref 11.1–15.9)
HGB UR QL STRIP: ABNORMAL
HIV 1+2 AB+HIV1 P24 AG SERPL QL IA: NON REACTIVE
IMM GRANULOCYTES NFR BLD AUTO: 0 %
KETONES UR QL STRIP: NEGATIVE
LDLC SERPL CALC-MCNC: 153 MG/DL (ref 0–99)
LEUKOCYTE ESTERASE UR QL STRIP: ABNORMAL
LYMPHOCYTES # BLD AUTO: 1.4 X10E3/UL (ref 0.7–3.1)
LYMPHOCYTES NFR BLD AUTO: 32 %
MCH RBC QN AUTO: 29.8 PG (ref 26.6–33)
MCHC RBC AUTO-ENTMCNC: 33.4 G/DL (ref 31.5–35.7)
MCV RBC AUTO: 89 FL (ref 79–97)
MICRO URNS: ABNORMAL
MONOCYTES # BLD AUTO: 0.3 X10E3/UL (ref 0.1–0.9)
MONOCYTES NFR BLD AUTO: 7 %
NEUTROPHILS # BLD AUTO: 2.5 X10E3/UL (ref 1.4–7)
NEUTROPHILS NFR BLD AUTO: 57 %
NITRITE UR QL STRIP: NEGATIVE
PH UR STRIP: 7 [PH] (ref 5–7.5)
PLATELET # BLD AUTO: 281 X10E3/UL (ref 150–450)
POTASSIUM SERPL-SCNC: 3.9 MMOL/L (ref 3.5–5.2)
PROT SERPL-MCNC: 7.2 G/DL (ref 6–8.5)
PROT UR QL STRIP: ABNORMAL
RBC # BLD AUTO: 4.66 X10E6/UL (ref 3.77–5.28)
RBC #/AREA URNS HPF: NORMAL /HPF (ref 0–2)
SODIUM SERPL-SCNC: 139 MMOL/L (ref 134–144)
SP GR UR STRIP: 1.02 (ref 1–1.03)
TRIGL SERPL-MCNC: 134 MG/DL (ref 0–149)
TSH SERPL DL<=0.005 MIU/L-ACNC: 5.37 UIU/ML (ref 0.45–4.5)
UROBILINOGEN UR STRIP-MCNC: 0.2 MG/DL (ref 0.2–1)
VLDLC SERPL CALC-MCNC: 24 MG/DL (ref 5–40)
WBC # BLD AUTO: 4.3 X10E3/UL (ref 3.4–10.8)
WBC #/AREA URNS HPF: NORMAL /HPF (ref 0–5)

## 2022-11-02 NOTE — PROGRESS NOTES
Please inform patient of results.    1. Spot glucose is elevated. A1c is 6.3-  she is very close to dmii (6.4 +) Watch diet/exercise.  Encourage weight loss. Will want to repeat this in 3 months.   2. TC and LDL are elevated. Watch diet/exercise  3.tsh is elevated. This may be contributing to her weight gain as well.  Consider starting on low dose synthroid. Will need to repeat labs in 6-8 weeks after starting med. Confirm she would like to do this and I will send in meds    Keep scheduled follow up in 3 weeks. Can discuss more then    Other labwork within acceptable ranges.

## 2022-12-15 ENCOUNTER — DOCUMENTATION ONLY (OUTPATIENT)
Dept: ADMINISTRATIVE | Facility: HOSPITAL | Age: 52
End: 2022-12-15
Payer: COMMERCIAL

## 2022-12-28 ENCOUNTER — OFFICE VISIT (OUTPATIENT)
Dept: FAMILY MEDICINE | Facility: CLINIC | Age: 52
End: 2022-12-28
Payer: COMMERCIAL

## 2022-12-28 ENCOUNTER — PATIENT OUTREACH (OUTPATIENT)
Dept: ADMINISTRATIVE | Facility: HOSPITAL | Age: 52
End: 2022-12-28
Payer: COMMERCIAL

## 2022-12-28 VITALS
TEMPERATURE: 98 F | DIASTOLIC BLOOD PRESSURE: 84 MMHG | HEIGHT: 64 IN | HEART RATE: 83 BPM | SYSTOLIC BLOOD PRESSURE: 126 MMHG | BODY MASS INDEX: 34.83 KG/M2 | RESPIRATION RATE: 16 BRPM | WEIGHT: 204 LBS | OXYGEN SATURATION: 98 %

## 2022-12-28 DIAGNOSIS — E03.9 HYPOTHYROIDISM, UNSPECIFIED TYPE: ICD-10-CM

## 2022-12-28 DIAGNOSIS — I10 HYPERTENSION, UNSPECIFIED TYPE: ICD-10-CM

## 2022-12-28 DIAGNOSIS — Z82.69 FAMILY HISTORY OF SYSTEMIC LUPUS ERYTHEMATOSUS: ICD-10-CM

## 2022-12-28 DIAGNOSIS — E66.01 CLASS 2 SEVERE OBESITY WITH SERIOUS COMORBIDITY AND BODY MASS INDEX (BMI) OF 35.0 TO 35.9 IN ADULT, UNSPECIFIED OBESITY TYPE: Primary | ICD-10-CM

## 2022-12-28 DIAGNOSIS — R73.03 PREDIABETES: ICD-10-CM

## 2022-12-28 PROCEDURE — 1160F RVW MEDS BY RX/DR IN RCRD: CPT | Mod: CPTII,,, | Performed by: FAMILY MEDICINE

## 2022-12-28 PROCEDURE — 3008F PR BODY MASS INDEX (BMI) DOCUMENTED: ICD-10-PCS | Mod: CPTII,,, | Performed by: FAMILY MEDICINE

## 2022-12-28 PROCEDURE — 1159F MED LIST DOCD IN RCRD: CPT | Mod: CPTII,,, | Performed by: FAMILY MEDICINE

## 2022-12-28 PROCEDURE — 3044F PR MOST RECENT HEMOGLOBIN A1C LEVEL <7.0%: ICD-10-PCS | Mod: CPTII,,, | Performed by: FAMILY MEDICINE

## 2022-12-28 PROCEDURE — 3074F SYST BP LT 130 MM HG: CPT | Mod: CPTII,,, | Performed by: FAMILY MEDICINE

## 2022-12-28 PROCEDURE — 3079F DIAST BP 80-89 MM HG: CPT | Mod: CPTII,,, | Performed by: FAMILY MEDICINE

## 2022-12-28 PROCEDURE — 3079F PR MOST RECENT DIASTOLIC BLOOD PRESSURE 80-89 MM HG: ICD-10-PCS | Mod: CPTII,,, | Performed by: FAMILY MEDICINE

## 2022-12-28 PROCEDURE — 99214 OFFICE O/P EST MOD 30 MIN: CPT | Mod: ,,, | Performed by: FAMILY MEDICINE

## 2022-12-28 PROCEDURE — 3044F HG A1C LEVEL LT 7.0%: CPT | Mod: CPTII,,, | Performed by: FAMILY MEDICINE

## 2022-12-28 PROCEDURE — 1160F PR REVIEW ALL MEDS BY PRESCRIBER/CLIN PHARMACIST DOCUMENTED: ICD-10-PCS | Mod: CPTII,,, | Performed by: FAMILY MEDICINE

## 2022-12-28 PROCEDURE — 3074F PR MOST RECENT SYSTOLIC BLOOD PRESSURE < 130 MM HG: ICD-10-PCS | Mod: CPTII,,, | Performed by: FAMILY MEDICINE

## 2022-12-28 PROCEDURE — 99214 PR OFFICE/OUTPT VISIT, EST, LEVL IV, 30-39 MIN: ICD-10-PCS | Mod: ,,, | Performed by: FAMILY MEDICINE

## 2022-12-28 PROCEDURE — 4010F PR ACE/ARB THEARPY RXD/TAKEN: ICD-10-PCS | Mod: CPTII,,, | Performed by: FAMILY MEDICINE

## 2022-12-28 PROCEDURE — 1159F PR MEDICATION LIST DOCUMENTED IN MEDICAL RECORD: ICD-10-PCS | Mod: CPTII,,, | Performed by: FAMILY MEDICINE

## 2022-12-28 PROCEDURE — 3008F BODY MASS INDEX DOCD: CPT | Mod: CPTII,,, | Performed by: FAMILY MEDICINE

## 2022-12-28 PROCEDURE — 4010F ACE/ARB THERAPY RXD/TAKEN: CPT | Mod: CPTII,,, | Performed by: FAMILY MEDICINE

## 2022-12-28 RX ORDER — PHENTERMINE HYDROCHLORIDE 37.5 MG/1
37.5 TABLET ORAL
Qty: 30 TABLET | Refills: 0 | Status: SHIPPED | OUTPATIENT
Start: 2022-12-28 | End: 2023-01-26

## 2022-12-28 RX ORDER — LEVOTHYROXINE SODIUM 50 UG/1
50 TABLET ORAL
Qty: 30 TABLET | Refills: 11 | Status: SHIPPED | OUTPATIENT
Start: 2022-12-28 | End: 2023-03-27

## 2022-12-28 NOTE — ASSESSMENT & PLAN NOTE
adipex sent in at [facility] 10/25/22.  Patient has not lost any weight.  States felt like was doing nothing.  Has been out for over 1 month.      Since this time, labs have shown concern for hypothyroidism.  Will treat for this and send in adipex rx again.  Cautioned if she notes palpitations or jitteriness, to decrease dose or stop adipex and call clinic.      Reminded patient this is a short term prescription to be used in conjunction with diet and exercise.  She is to monitor her bp at home.     She will be seen monthly while on meds to monitor bp and weight loss. Contact clinic for concerns.  Patient is agreeable to plan and verbalized understanding

## 2022-12-28 NOTE — PROGRESS NOTES
Subjective:        Patient ID: Cookie Membreno is a 52 y.o. female.    Chief Complaint: Follow-up (Obesity and thyroid f/u)      Patient presents to clinic unaccompanied for follow up.  She is due for her wellness visit in October.  She did labs prior to her visit today and it was reviewed with patient at time of appt today     She is obese.  Has gained weight.  Her stepson is getting  in 4/2023 and would like to lose weight before then.  She was previously on belviq which worked but was too expensive.  We started adipex at her lov 10/2022.  She is here today for follow up.  Her weight at that visit was 203#.  Her weight today is 204#    She is prediabetic.  A1c was 6.3 11/2022.     Her tsh was also elevated.  +family history of thyroid issues in sisters.  Also reports family history of lupus in sister. Asking for labs. Reports fatigue, dry hair and skin, and weight gain.     She has a history of HTN and HLD. Reports compliance with her meds.  Lisinopril caused cough.  Monitors her bp at home.      She has personal history of breast cancer dx 2015.  She had bilateral mastectomy and complete hysterectomy and bso.  She was on tamoxifen and had breast reconstruction.  After her hyst, dr. Salazar prescribed paxil for her hot flashes.  She follows with dr. Patino.     She reported increased anxiety/depression/stress/insomnia at her visit 2/2022.  Her older sister who had breast cancer had recently passed and a younger brother was dx with sarcoidosis.  So we increased paxil to 20mg daily. Xanax was sent in prn panic attacks. Doing well.     She had a positive cologuard 6/2021 and had subsequent colonoscopy 7/2021 with dr. Asher loco     Had infected olecranon bursa.  Required surgery.  Doing much better now.      She drinks alcohol on occasion.  She declines immunizations       Review of Systems   Constitutional:  Positive for fatigue.   HENT: Negative.     Respiratory: Negative.     Cardiovascular:  "Negative.    Gastrointestinal: Negative.    Genitourinary: Negative.        Review of patient's allergies indicates:   Allergen Reactions    Cephalexin Hives, Itching and Shortness Of Breath    Hydrocodone     Propoxyphene n-acetaminophen       Vitals:    12/28/22 0810   BP: 126/84   BP Location: Right arm   Pulse: 83   Resp: 16   Temp: 97.9 °F (36.6 °C)   TempSrc: Temporal   SpO2: 98%   Weight: 92.5 kg (204 lb)   Height: 5' 4" (1.626 m)      Social History     Socioeconomic History    Marital status:    Tobacco Use    Smoking status: Former     Types: Vaping with nicotine    Smokeless tobacco: Never   Substance and Sexual Activity    Alcohol use: Yes     Alcohol/week: 1.0 standard drink     Types: 1 Drinks containing 0.5 oz of alcohol per week    Drug use: Never    Sexual activity: Yes     Partners: Male     Birth control/protection: None      History reviewed. No pertinent family history.       Objective:     Physical Exam  Vitals and nursing note reviewed.   Constitutional:       Appearance: Normal appearance. She is obese.   HENT:      Head: Normocephalic and atraumatic.      Nose: Nose normal.      Mouth/Throat:      Mouth: Mucous membranes are moist.      Pharynx: Oropharynx is clear.   Eyes:      Extraocular Movements: Extraocular movements intact.   Cardiovascular:      Rate and Rhythm: Normal rate and regular rhythm.      Pulses: Normal pulses.      Heart sounds: Normal heart sounds.   Pulmonary:      Effort: Pulmonary effort is normal.      Breath sounds: Normal breath sounds.   Musculoskeletal:         General: Normal range of motion.      Cervical back: Normal range of motion.   Skin:     General: Skin is warm and dry.   Neurological:      General: No focal deficit present.      Mental Status: She is alert and oriented to person, place, and time. Mental status is at baseline.   Psychiatric:         Mood and Affect: Mood normal.     Current Outpatient Medications on File Prior to Visit   Medication " Sig Dispense Refill    hydroCHLOROthiazide (HYDRODIURIL) 25 MG tablet Take 1 tablet (25 mg total) by mouth once daily. 90 tablet 3    losartan (COZAAR) 25 MG tablet Take 1 tablet (25 mg total) by mouth once daily. 90 tablet 3    paroxetine (PAXIL) 20 MG tablet Take 1 tablet (20 mg total) by mouth once daily. 90 tablet 3    pravastatin (PRAVACHOL) 40 MG tablet Take 1 tablet (40 mg total) by mouth once daily. 90 tablet 3    [DISCONTINUED] pantoprazole (PROTONIX) 40 MG tablet Take 1 tablet (40 mg total) by mouth once daily. 90 tablet 3     No current facility-administered medications on file prior to visit.     Health Maintenance   Topic Date Due    TETANUS VACCINE  10/25/2023 (Originally 3/27/1988)    Lipid Panel  11/01/2027    Hepatitis C Screening  Completed      Results for orders placed or performed in visit on 12/15/22   HPV DNA probe, amplified    Specimen: Cervix; Genital   Result Value Ref Range    HPV DNA None Detected None Detected   HM PAP SMEAR   Result Value Ref Range    PAP Recommendation External Pap in 12 months     Pap Epithelial cell abnormality (A) Negative for intraephithelial lesion or malignancy, Other          Assessment & Plan:     Active Problem List with Overview Notes    Diagnosis Date Noted    Prediabetes 12/28/2022    Hypothyroid 12/28/2022    Family history of systemic lupus erythematosus 12/28/2022    Wellness examination 10/25/2022    History of breast cancer 10/25/2022    H/O bilateral mastectomy 10/25/2022    Family history of diabetes mellitus 10/25/2022    Colon cancer screening 10/25/2022    Hyperlipidemia 10/25/2022    Hypertension 10/25/2022    Insomnia 10/25/2022    Class 2 severe obesity with serious comorbidity and body mass index (BMI) of 35.0 to 35.9 in adult 10/25/2022    Seasonal allergic rhinitis 10/25/2022    Tobacco user 10/25/2022    Stomach ulcer        1. Class 2 severe obesity with serious comorbidity and body mass index (BMI) of 35.0 to 35.9 in adult, unspecified  obesity type  Assessment & Plan:  adipex sent in at lov 10/25/22.  Patient has not lost any weight.  States felt like was doing nothing.  Has been out for over 1 month.      Since this time, labs have shown concern for hypothyroidism.  Will treat for this and send in adipex rx again.  Cautioned if she notes palpitations or jitteriness, to decrease dose or stop adipex and call clinic.      Reminded patient this is a short term prescription to be used in conjunction with diet and exercise.  She is to monitor her bp at home.     She will be seen monthly while on meds to monitor bp and weight loss. Contact clinic for concerns.  Patient is agreeable to plan and verbalized understanding          2. Prediabetes  Assessment & Plan:  Lab Results   Component Value Date    HGBA1C 6.3 (H) 11/01/2022     Encourage lifestyle change. Continue to work on diet/exercise. Hopefully this should improve with weight loss.  monitor    Is on arb and statin      3. Hypothyroidism, unspecified type  Assessment & Plan:  Will send in synthroid.  Take as directed. Will plan to repeat labs in 6-8 weeks orders in.     Orders:  -     TSH; Future; Expected date: 02/15/2023  -     T4, Free; Future; Expected date: 02/15/2023    4. Family history of systemic lupus erythematosus  Assessment & Plan:  Asking for lupus testing to be done when she repeats thyroid labs. Orders in.    Orders:  -     Lupus Comprehensive Panel; Future; Expected date: 12/28/2022    5. Hypertension, unspecified type  Assessment & Plan:  Well controlled on current meds. Not on asa due to GI      Other orders  -     levothyroxine (SYNTHROID) 50 MCG tablet; Take 1 tablet (50 mcg total) by mouth before breakfast.  Dispense: 30 tablet; Refill: 11  -     phentermine (ADIPEX-P) 37.5 mg tablet; Take 1 tablet (37.5 mg total) by mouth before breakfast.  Dispense: 30 tablet; Refill: 0         Follow up in about 1 month (around 1/28/2023) for obesity.

## 2022-12-28 NOTE — ASSESSMENT & PLAN NOTE
Lab Results   Component Value Date    HGBA1C 6.3 (H) 11/01/2022     Encourage lifestyle change. Continue to work on diet/exercise. Hopefully this should improve with weight loss.  monitor    Is on arb and statin

## 2023-01-03 ENCOUNTER — DOCUMENTATION ONLY (OUTPATIENT)
Dept: ADMINISTRATIVE | Facility: HOSPITAL | Age: 53
End: 2023-01-03
Payer: COMMERCIAL

## 2023-01-05 ENCOUNTER — DOCUMENTATION ONLY (OUTPATIENT)
Dept: FAMILY MEDICINE | Facility: CLINIC | Age: 53
End: 2023-01-05

## 2023-01-26 ENCOUNTER — OFFICE VISIT (OUTPATIENT)
Dept: FAMILY MEDICINE | Facility: CLINIC | Age: 53
End: 2023-01-26
Payer: COMMERCIAL

## 2023-01-26 ENCOUNTER — TELEPHONE (OUTPATIENT)
Dept: FAMILY MEDICINE | Facility: CLINIC | Age: 53
End: 2023-01-26

## 2023-01-26 VITALS
DIASTOLIC BLOOD PRESSURE: 80 MMHG | RESPIRATION RATE: 16 BRPM | BODY MASS INDEX: 35.06 KG/M2 | SYSTOLIC BLOOD PRESSURE: 118 MMHG | HEIGHT: 64 IN | WEIGHT: 205.38 LBS | TEMPERATURE: 97 F | HEART RATE: 64 BPM | OXYGEN SATURATION: 98 %

## 2023-01-26 DIAGNOSIS — E03.9 HYPOTHYROIDISM, UNSPECIFIED TYPE: ICD-10-CM

## 2023-01-26 DIAGNOSIS — R73.03 PREDIABETES: ICD-10-CM

## 2023-01-26 DIAGNOSIS — I10 HYPERTENSION, UNSPECIFIED TYPE: ICD-10-CM

## 2023-01-26 DIAGNOSIS — E78.5 HYPERLIPIDEMIA, UNSPECIFIED HYPERLIPIDEMIA TYPE: ICD-10-CM

## 2023-01-26 DIAGNOSIS — E66.01 CLASS 2 SEVERE OBESITY WITH SERIOUS COMORBIDITY AND BODY MASS INDEX (BMI) OF 35.0 TO 35.9 IN ADULT, UNSPECIFIED OBESITY TYPE: Primary | ICD-10-CM

## 2023-01-26 PROCEDURE — 99214 OFFICE O/P EST MOD 30 MIN: CPT | Mod: ,,, | Performed by: FAMILY MEDICINE

## 2023-01-26 PROCEDURE — 3008F PR BODY MASS INDEX (BMI) DOCUMENTED: ICD-10-PCS | Mod: CPTII,,, | Performed by: FAMILY MEDICINE

## 2023-01-26 PROCEDURE — 3074F PR MOST RECENT SYSTOLIC BLOOD PRESSURE < 130 MM HG: ICD-10-PCS | Mod: CPTII,,, | Performed by: FAMILY MEDICINE

## 2023-01-26 PROCEDURE — 1160F PR REVIEW ALL MEDS BY PRESCRIBER/CLIN PHARMACIST DOCUMENTED: ICD-10-PCS | Mod: CPTII,,, | Performed by: FAMILY MEDICINE

## 2023-01-26 PROCEDURE — 3079F PR MOST RECENT DIASTOLIC BLOOD PRESSURE 80-89 MM HG: ICD-10-PCS | Mod: CPTII,,, | Performed by: FAMILY MEDICINE

## 2023-01-26 PROCEDURE — 1159F PR MEDICATION LIST DOCUMENTED IN MEDICAL RECORD: ICD-10-PCS | Mod: CPTII,,, | Performed by: FAMILY MEDICINE

## 2023-01-26 PROCEDURE — 3008F BODY MASS INDEX DOCD: CPT | Mod: CPTII,,, | Performed by: FAMILY MEDICINE

## 2023-01-26 PROCEDURE — 1160F RVW MEDS BY RX/DR IN RCRD: CPT | Mod: CPTII,,, | Performed by: FAMILY MEDICINE

## 2023-01-26 PROCEDURE — 3079F DIAST BP 80-89 MM HG: CPT | Mod: CPTII,,, | Performed by: FAMILY MEDICINE

## 2023-01-26 PROCEDURE — 3074F SYST BP LT 130 MM HG: CPT | Mod: CPTII,,, | Performed by: FAMILY MEDICINE

## 2023-01-26 PROCEDURE — 1159F MED LIST DOCD IN RCRD: CPT | Mod: CPTII,,, | Performed by: FAMILY MEDICINE

## 2023-01-26 PROCEDURE — 99214 PR OFFICE/OUTPT VISIT, EST, LEVL IV, 30-39 MIN: ICD-10-PCS | Mod: ,,, | Performed by: FAMILY MEDICINE

## 2023-01-26 RX ORDER — SEMAGLUTIDE 1.34 MG/ML
0.25 INJECTION, SOLUTION SUBCUTANEOUS
Qty: 1 PEN | Refills: 5 | Status: SHIPPED | OUTPATIENT
Start: 2023-01-26 | End: 2023-01-27

## 2023-01-26 NOTE — TELEPHONE ENCOUNTER
Can she have the pharmacy or the insurance check to see what alternative meds are covered on her plan for prediabetes and obesity.  Once I know that, I can send in for her.

## 2023-01-26 NOTE — TELEPHONE ENCOUNTER
----- Message from Yeny Workman LPN sent at 1/26/2023  1:40 PM CST -----  Regarding: FW: medication    ----- Message -----  From: Jennifer Mcarthur  Sent: 1/26/2023   1:29 PM CST  To: Poon[facility] Staff  Subject: medication                                       .Type:  Needs Medical Advice    Who Called: pt   Symptoms (please be specific):    How long has patient had these symptoms:    Pharmacy name and phone #:    Would the patient rather a call back or a response via [facility]?   Best Call Back Number: [phone]  Additional Information: pt states that the medication she was prescribed cannot be filled for weight loss, it has to have the diagnosis for diabetes and the pt isn't diabetic.

## 2023-01-26 NOTE — ASSESSMENT & PLAN NOTE
On adipex x 2 months without weight loss.      Recently started on synthroid for hypothyroidism 12/2022.  No weight loss.     Due to prediabetes, will try ozempic.  rx sent in. Take as directed.  demonstrator pen used in clinic with patient. Advised to contact us for concerns.  Patient is agreeable to plan and verbalized understanding

## 2023-01-26 NOTE — ASSESSMENT & PLAN NOTE
Lab Results   Component Value Date    HGBA1C 6.3 (H) 11/01/2022     Encourage lifestyle change. monitor    Is on arb and statin. Will try ozempic to aid with weight loss as well

## 2023-01-26 NOTE — PROGRESS NOTES
Subjective:        Patient ID: Cookie Membreno is a 52 y.o. female.    Chief Complaint: Follow-up (1 month follow up/Thyroid follow up)      Patient presents to clinic unaccompanied for follow up.  She is due for her wellness visit in October.       She is obese. Her stepson is getting  in 4/2023 and would like to lose weight before then.  She was previously on belviq which worked but was too expensive.  We started adipex at her lov 10/2022 x 2 months but has not lost weight.  We also tried synthroid due to new dx of hypothyroidism but has not lost weight.  Is exercising and eating well.  Would like to try another med if possible     She is prediabetic.  A1c was 6.3 11/2022.      Her tsh was also elevated 10/2022.  +family history of thyroid issues in sisters.  Also reports family history of lupus in sister. Reports fatigue, dry hair and skin, and weight gain. We started her on synthroid 50mcg.  Has not yet done repeat labs.      She has  HTN and HLD. Reports compliance with her meds.  Lisinopril caused cough.  Monitors her bp at home.      She has personal history of breast cancer dx 2015.  She had bilateral mastectomy and complete hysterectomy and bso.  She was on tamoxifen and had breast reconstruction.  After her hyst, dr. Salazar prescribed paxil for her hot flashes.  She follows with dr. Patino.     She reported increased anxiety/depression/stress/insomnia at her visit 2/2022.  Her older sister who had breast cancer had recently passed and a younger brother was dx with sarcoidosis.  So we increased paxil to 20mg daily. Xanax was sent in prn panic attacks. Doing well.     She had a positive cologuard 6/2021 and had subsequent colonoscopy 7/2021 with dr. Asher loco     Had infected olecranon bursa.  Required surgery.  Doing much better now.      She drinks alcohol on occasion.  She declines immunizations    Review of Systems   Constitutional: Negative.    HENT: Negative.     Respiratory:  "Negative.     Cardiovascular: Negative.    Gastrointestinal: Negative.    Genitourinary: Negative.        Review of patient's allergies indicates:   Allergen Reactions    Cephalexin Hives, Itching and Shortness Of Breath    Hydrocodone     Propoxyphene n-acetaminophen       Vitals:    01/26/23 1028   BP: 118/80   BP Location: Left arm   Pulse: 64   Resp: 16   Temp: 97.4 °F (36.3 °C)   TempSrc: Temporal   SpO2: 98%   Weight: 93.2 kg (205 lb 6.4 oz)   Height: 5' 4" (1.626 m)      Social History     Socioeconomic History    Marital status:    Tobacco Use    Smoking status: Former     Types: Vaping with nicotine    Smokeless tobacco: Never   Substance and Sexual Activity    Alcohol use: Yes     Alcohol/week: 1.0 standard drink     Types: 1 Drinks containing 0.5 oz of alcohol per week    Drug use: Never    Sexual activity: Yes     Partners: Male     Birth control/protection: None      History reviewed. No pertinent family history.       Objective:     Physical Exam  Vitals and nursing note reviewed.   Constitutional:       Appearance: Normal appearance. She is obese.   HENT:      Head: Normocephalic and atraumatic.      Nose: Nose normal.      Mouth/Throat:      Mouth: Mucous membranes are moist.      Pharynx: Oropharynx is clear.   Eyes:      Extraocular Movements: Extraocular movements intact.   Cardiovascular:      Rate and Rhythm: Normal rate and regular rhythm.      Pulses: Normal pulses.      Heart sounds: Normal heart sounds.   Pulmonary:      Effort: Pulmonary effort is normal.      Breath sounds: Normal breath sounds.   Musculoskeletal:         General: Normal range of motion.      Cervical back: Normal range of motion.   Skin:     General: Skin is warm and dry.   Neurological:      General: No focal deficit present.      Mental Status: She is alert and oriented to person, place, and time. Mental status is at baseline.   Psychiatric:         Mood and Affect: Mood normal.     Current Outpatient Medications " on File Prior to Visit   Medication Sig Dispense Refill    hydroCHLOROthiazide (HYDRODIURIL) 25 MG tablet Take 1 tablet (25 mg total) by mouth once daily. 90 tablet 3    levothyroxine (SYNTHROID) 50 MCG tablet Take 1 tablet (50 mcg total) by mouth before breakfast. 30 tablet 11    paroxetine (PAXIL) 20 MG tablet Take 1 tablet (20 mg total) by mouth once daily. 90 tablet 3    [DISCONTINUED] phentermine (ADIPEX-P) 37.5 mg tablet Take 1 tablet (37.5 mg total) by mouth before breakfast. 30 tablet 0    losartan (COZAAR) 25 MG tablet Take 1 tablet (25 mg total) by mouth once daily. 90 tablet 3    pravastatin (PRAVACHOL) 40 MG tablet Take 1 tablet (40 mg total) by mouth once daily. 90 tablet 3     No current facility-administered medications on file prior to visit.     Health Maintenance   Topic Date Due    TETANUS VACCINE  10/25/2023 (Originally 3/27/1988)    Lipid Panel  11/01/2027    Hepatitis C Screening  Completed      Results for orders placed or performed in visit on 01/05/23    COLONOSCOPY   Result Value Ref Range    CRC Recommendation External No follow-up frequency specified           Assessment & Plan:     Active Problem List with Overview Notes    Diagnosis Date Noted    Prediabetes 12/28/2022    Hypothyroid 12/28/2022    Family history of systemic lupus erythematosus 12/28/2022    Wellness examination 10/25/2022    History of breast cancer 10/25/2022    H/O bilateral mastectomy 10/25/2022    Family history of diabetes mellitus 10/25/2022    Colon cancer screening 10/25/2022    Hyperlipidemia 10/25/2022    Hypertension 10/25/2022    Insomnia 10/25/2022    Class 2 severe obesity with serious comorbidity and body mass index (BMI) of 35.0 to 35.9 in adult 10/25/2022    Seasonal allergic rhinitis 10/25/2022    Tobacco user 10/25/2022    Stomach ulcer        1. Class 2 severe obesity with serious comorbidity and body mass index (BMI) of 35.0 to 35.9 in adult, unspecified obesity type  Assessment & Plan:  On adipex  x 2 months without weight loss.      Recently started on synthroid for hypothyroidism 12/2022.  No weight loss.     Due to prediabetes, will try ozempic.  rx sent in. Take as directed.  demonstrator pen used in clinic with patient. Advised to contact us for concerns.  Patient is agreeable to plan and verbalized understanding      Orders:  -     semaglutide (OZEMPIC) 0.25 mg or 0.5 mg(2 mg/1.5 mL) pen injector; Inject 0.25 mg into the skin every 7 days.  Dispense: 1 pen; Refill: 5    2. Hypothyroidism, unspecified type  Assessment & Plan:  On synthroid 50mcg x about 1 month. Repeat labs ordered. Will call with results when available    Orders:  -     TSH; Future; Expected date: 01/26/2023  -     T4, Free; Future; Expected date: 01/26/2023  -     T3, Free ([facility]); Future; Expected date: 01/26/2023  -     Thyroid Peroxidase Antibody; Future; Expected date: 01/26/2023    3. Prediabetes  Assessment & Plan:  Lab Results   Component Value Date    HGBA1C 6.3 (H) 11/01/2022     Encourage lifestyle change. monitor    Is on arb and statin. Will try ozempic to aid with weight loss as well    Orders:  -     semaglutide (OZEMPIC) 0.25 mg or 0.5 mg(2 mg/1.5 mL) pen injector; Inject 0.25 mg into the skin every 7 days.  Dispense: 1 pen; Refill: 5    4. Hypertension, unspecified type  Assessment & Plan:  Well controlled on current meds. Not on asa due to GI    Orders:  -     semaglutide (OZEMPIC) 0.25 mg or 0.5 mg(2 mg/1.5 mL) pen injector; Inject 0.25 mg into the skin every 7 days.  Dispense: 1 pen; Refill: 5    5. Hyperlipidemia, unspecified hyperlipidemia type  Assessment & Plan:  On statin    Orders:  -     semaglutide (OZEMPIC) 0.25 mg or 0.5 mg(2 mg/1.5 mL) pen injector; Inject 0.25 mg into the skin every 7 days.  Dispense: 1 pen; Refill: 5         Follow up in about 4 weeks (around 2/23/2023) for obesity.

## 2023-01-27 ENCOUNTER — TELEPHONE (OUTPATIENT)
Dept: FAMILY MEDICINE | Facility: CLINIC | Age: 53
End: 2023-01-27
Payer: COMMERCIAL

## 2023-01-27 DIAGNOSIS — Z83.3 FAMILY HISTORY OF DIABETES MELLITUS: Primary | ICD-10-CM

## 2023-01-27 DIAGNOSIS — E66.01 CLASS 2 SEVERE OBESITY DUE TO EXCESS CALORIES WITH SERIOUS COMORBIDITY AND BODY MASS INDEX (BMI) OF 35.0 TO 35.9 IN ADULT: ICD-10-CM

## 2023-01-27 DIAGNOSIS — R73.03 PREDIABETES: ICD-10-CM

## 2023-01-27 RX ORDER — SEMAGLUTIDE 0.25 MG/.5ML
0.25 INJECTION, SOLUTION SUBCUTANEOUS
Qty: 2 ML | Refills: 3 | Status: SHIPPED | OUTPATIENT
Start: 2023-01-27 | End: 2023-02-23

## 2023-01-27 NOTE — TELEPHONE ENCOUNTER
----- Message from Yeny Workman LPN sent at 1/27/2023  8:57 AM CST -----  Regarding: FW: med refill  This is the patient's formulary alternative for ozempic   ----- Message -----  From: Charity Ely  Sent: 1/27/2023   8:47 AM CST  To: [facility] Staff  Subject: med refill                                       Pt called about needing to let the nurse know about the alternative of a medication the was going to be prescribed. She stated that (wegovy) was the alternative     [facility] [phone]

## 2023-01-27 NOTE — TELEPHONE ENCOUNTER
Rx sent in. Same instructions as ozempic.  Contact clinic for concerns.       I have signed for the following orders AND/OR meds.  Please call the patient and ask the patient to schedule the testing AND/OR inform about any medications that were sent.        Medications Ordered This Encounter   Medications    semaglutide, weight loss, (WEGOVY) 0.25 mg/0.5 mL PnIj     Sig: Inject 0.25 mg into the skin every 7 days.     Dispense:  2 mL     Refill:  3

## 2023-01-30 PROBLEM — Z00.00 WELLNESS EXAMINATION: Status: RESOLVED | Noted: 2022-10-25 | Resolved: 2023-01-30

## 2023-02-02 ENCOUNTER — TELEPHONE (OUTPATIENT)
Dept: FAMILY MEDICINE | Facility: CLINIC | Age: 53
End: 2023-02-02
Payer: COMMERCIAL

## 2023-02-02 RX ORDER — PHENTERMINE HYDROCHLORIDE 37.5 MG/1
37.5 TABLET ORAL
Qty: 30 TABLET | Refills: 0 | Status: SHIPPED | OUTPATIENT
Start: 2023-02-02 | End: 2023-02-24 | Stop reason: SDUPTHER

## 2023-02-02 NOTE — TELEPHONE ENCOUNTER
----- Message from Maryann Bach sent at 2/2/2023 12:15 PM CST -----  Regarding: med refill  .Type:  RX Refill Request    Who Called: pt  Refill or New Rx:refill  RX Name and Strength:phentermine (ADIPEX-P) 37.5 mg tablet  How is the patient currently taking it? (ex. 1XDay):  Is this a 30 day or 90 day RX:30  [facility] with phone number[facility]   Phone: [phone]  Local or Mail Order:local  Ordering Provider:Poonam  Would the patient rather a call back or a response via [facility]? Call back  Best Call Back Number:[phone]  Additional Information: pt needs authorization from  to refill the prescription

## 2023-02-02 NOTE — TELEPHONE ENCOUNTER
Was she not able to get the wegovy?  If she wants to continue the adipex, please make sure she is scheduled for follow up with me in office in 1 month     have signed for the following orders AND/OR meds.  Please call the patient and ask the patient to schedule the testing AND/OR inform about any medications that were sent.      Medications Ordered This Encounter   Medications    phentermine (ADIPEX-P) 37.5 mg tablet     Sig: Take 1 tablet (37.5 mg total) by mouth before breakfast.     Dispense:  30 tablet     Refill:  0

## 2023-02-15 ENCOUNTER — TELEPHONE (OUTPATIENT)
Dept: FAMILY MEDICINE | Facility: CLINIC | Age: 53
End: 2023-02-15
Payer: COMMERCIAL

## 2023-02-15 NOTE — TELEPHONE ENCOUNTER
I attempted to submit a PA for the patient's Wegovy. Once submitted, pt's insurance required further information. I called them and was notified that the patient's PA was denied, and could not be appealed since that medication was not covered at all by her plan. Attempted to notify patient. LVM for return call

## 2023-02-16 ENCOUNTER — TELEPHONE (OUTPATIENT)
Dept: FAMILY MEDICINE | Facility: CLINIC | Age: 53
End: 2023-02-16
Payer: COMMERCIAL

## 2023-02-16 NOTE — TELEPHONE ENCOUNTER
----- Message from Maribel Woo sent at 2/16/2023  8:04 AM CST -----  Regarding: Call back  .Type:  Patient Returning Call    Who Called:Pt  Who Left Message for Patient:  Does the patient know what this is regarding?:semaglutide, weight loss, (WEGOVY) 0.25 mg/0.5 mL PnIj  Would the patient rather a call back or a response via [facility]? Call back  Best Call Back Number:[phone]  Additional Information: Wants to know if the insurance will cover med, pls f/u with pt and if you edie voicemail leave [facility] [place].

## 2023-02-16 NOTE — TELEPHONE ENCOUNTER
See message from 2.15.23. Patient notified of that information and voiced understanding. Patient advised to call her insurance to see if any type of that kind of rx is covered. She will call her insurance and let us know. Joseph

## 2023-02-23 RX ORDER — SEMAGLUTIDE 1.34 MG/ML
0.25 INJECTION, SOLUTION SUBCUTANEOUS
Qty: 1 PEN | Refills: 0 | Status: SHIPPED | OUTPATIENT
Start: 2023-02-23 | End: 2023-02-24

## 2023-02-24 ENCOUNTER — OFFICE VISIT (OUTPATIENT)
Dept: FAMILY MEDICINE | Facility: CLINIC | Age: 53
End: 2023-02-24
Payer: COMMERCIAL

## 2023-02-24 VITALS
OXYGEN SATURATION: 99 % | WEIGHT: 200.88 LBS | DIASTOLIC BLOOD PRESSURE: 82 MMHG | HEIGHT: 64 IN | TEMPERATURE: 98 F | SYSTOLIC BLOOD PRESSURE: 128 MMHG | BODY MASS INDEX: 34.3 KG/M2 | HEART RATE: 68 BPM | RESPIRATION RATE: 16 BRPM

## 2023-02-24 DIAGNOSIS — R73.03 PREDIABETES: ICD-10-CM

## 2023-02-24 DIAGNOSIS — E03.9 HYPOTHYROIDISM, UNSPECIFIED TYPE: ICD-10-CM

## 2023-02-24 DIAGNOSIS — E66.01 CLASS 2 SEVERE OBESITY WITH SERIOUS COMORBIDITY AND BODY MASS INDEX (BMI) OF 35.0 TO 35.9 IN ADULT, UNSPECIFIED OBESITY TYPE: Primary | ICD-10-CM

## 2023-02-24 PROCEDURE — 3079F DIAST BP 80-89 MM HG: CPT | Mod: CPTII,,, | Performed by: FAMILY MEDICINE

## 2023-02-24 PROCEDURE — 4010F ACE/ARB THERAPY RXD/TAKEN: CPT | Mod: CPTII,,, | Performed by: FAMILY MEDICINE

## 2023-02-24 PROCEDURE — 99214 PR OFFICE/OUTPT VISIT, EST, LEVL IV, 30-39 MIN: ICD-10-PCS | Mod: ,,, | Performed by: FAMILY MEDICINE

## 2023-02-24 PROCEDURE — 3074F SYST BP LT 130 MM HG: CPT | Mod: CPTII,,, | Performed by: FAMILY MEDICINE

## 2023-02-24 PROCEDURE — 4010F PR ACE/ARB THEARPY RXD/TAKEN: ICD-10-PCS | Mod: CPTII,,, | Performed by: FAMILY MEDICINE

## 2023-02-24 PROCEDURE — 1159F MED LIST DOCD IN RCRD: CPT | Mod: CPTII,,, | Performed by: FAMILY MEDICINE

## 2023-02-24 PROCEDURE — 99214 OFFICE O/P EST MOD 30 MIN: CPT | Mod: ,,, | Performed by: FAMILY MEDICINE

## 2023-02-24 PROCEDURE — 3008F PR BODY MASS INDEX (BMI) DOCUMENTED: ICD-10-PCS | Mod: CPTII,,, | Performed by: FAMILY MEDICINE

## 2023-02-24 PROCEDURE — 1159F PR MEDICATION LIST DOCUMENTED IN MEDICAL RECORD: ICD-10-PCS | Mod: CPTII,,, | Performed by: FAMILY MEDICINE

## 2023-02-24 PROCEDURE — 3079F PR MOST RECENT DIASTOLIC BLOOD PRESSURE 80-89 MM HG: ICD-10-PCS | Mod: CPTII,,, | Performed by: FAMILY MEDICINE

## 2023-02-24 PROCEDURE — 3008F BODY MASS INDEX DOCD: CPT | Mod: CPTII,,, | Performed by: FAMILY MEDICINE

## 2023-02-24 PROCEDURE — 1160F RVW MEDS BY RX/DR IN RCRD: CPT | Mod: CPTII,,, | Performed by: FAMILY MEDICINE

## 2023-02-24 PROCEDURE — 3074F PR MOST RECENT SYSTOLIC BLOOD PRESSURE < 130 MM HG: ICD-10-PCS | Mod: CPTII,,, | Performed by: FAMILY MEDICINE

## 2023-02-24 PROCEDURE — 1160F PR REVIEW ALL MEDS BY PRESCRIBER/CLIN PHARMACIST DOCUMENTED: ICD-10-PCS | Mod: CPTII,,, | Performed by: FAMILY MEDICINE

## 2023-02-24 RX ORDER — PANTOPRAZOLE SODIUM 40 MG/1
40 TABLET, DELAYED RELEASE ORAL
COMMUNITY
Start: 2023-02-01

## 2023-02-24 RX ORDER — ONDANSETRON 4 MG/1
4 TABLET, FILM COATED ORAL EVERY 12 HOURS PRN
Qty: 15 TABLET | Refills: 1 | Status: SHIPPED | OUTPATIENT
Start: 2023-02-24

## 2023-02-24 RX ORDER — PHENTERMINE HYDROCHLORIDE 37.5 MG/1
37.5 TABLET ORAL
Qty: 30 TABLET | Refills: 0 | Status: SHIPPED | OUTPATIENT
Start: 2023-03-04 | End: 2023-03-27 | Stop reason: SDUPTHER

## 2023-02-24 NOTE — ASSESSMENT & PLAN NOTE
Lab Results   Component Value Date    HGBA1C 6.3 (H) 11/01/2022     Encourage lifestyle change. monitor    Is on arb and statin.repeat labs ordered. Will call with results when available

## 2023-02-24 NOTE — PROGRESS NOTES
Subjective:        Patient ID: Cookie Membreno is a 52 y.o. female.    Chief Complaint: Follow-up (Weight loss follow up)      Patient presents to clinic unaccompanied for follow up.  She is due for her wellness visit in October.       She is obese. Her stepson is getting  in 4/2023 and would like to lose weight before then.  She was previously on belviq which worked but was too expensive.  We started adipex at her lov 10/2022 x 2 months but has not lost weight.  We also tried synthroid due to new dx of hypothyroidism but has not lost weight.  Is exercising and eating well.  Would like to try another med if possible. Insurance would not cover wegovy or mounjaro so we sent in adipex again until we could find a medication.   She is here today for follow up.  She has lost 5#. She is on wegovy but had to pay out of pocket ($1400 for 1 month supply) because insurance would not cover- got on tuesday.  Does not want to pay that again.  Exercising. Eating better.      She is prediabetic.  A1c was 6.3 11/2022.      Her tsh was also elevated 10/2022.  +family history of thyroid issues in sisters.  Also reports family history of lupus in sister. Reports fatigue, dry hair and skin, and weight gain. We started her on synthroid 50mcg.  Has not yet done repeat labs. Plans to go do labs asap.       She has  HTN and HLD. Reports compliance with her meds.  Lisinopril caused cough.  Monitors her bp at home.      She has personal history of breast cancer dx 2015.  She had bilateral mastectomy and complete hysterectomy and bso.  She was on tamoxifen and had breast reconstruction.  After her hyst, dr. Salazar prescribed paxil for her hot flashes.  She follows with dr. Patino.     She reported increased anxiety/depression/stress/insomnia at her visit 2/2022.  Her older sister who had breast cancer passed and a younger brother was dx with sarcoidosis.  So we increased paxil to 20mg daily. Xanax was sent in prn panic attacks.  "Doing well.     She had a positive cologuard 6/2021 and had subsequent colonoscopy 7/2021 with dr. Asher loco     Had infected olecranon bursa.  Required surgery.  Doing much better now.      She drinks alcohol on occasion.  She declines immunizations    Review of Systems   Constitutional: Negative.    HENT: Negative.     Respiratory: Negative.     Cardiovascular: Negative.    Gastrointestinal: Negative.    Genitourinary: Negative.        Review of patient's allergies indicates:   Allergen Reactions    Cephalexin Hives, Itching and Shortness Of Breath    Hydrocodone     Propoxyphene n-acetaminophen       Vitals:    02/24/23 0806   BP: 128/82   BP Location: Right arm   Pulse: 68   Resp: 16   Temp: 98.2 °F (36.8 °C)   TempSrc: Temporal   SpO2: 99%   Weight: 91.1 kg (200 lb 14.4 oz)   Height: 5' 4" (1.626 m)      Social History     Socioeconomic History    Marital status:    Tobacco Use    Smoking status: Former     Types: Vaping with nicotine    Smokeless tobacco: Never   Substance and Sexual Activity    Alcohol use: Yes     Alcohol/week: 1.0 standard drink     Types: 1 Drinks containing 0.5 oz of alcohol per week    Drug use: Never    Sexual activity: Yes     Partners: Male     Birth control/protection: None      History reviewed. No pertinent family history.       Objective:     Physical Exam  Vitals and nursing note reviewed.   Constitutional:       Appearance: Normal appearance. She is obese.   HENT:      Head: Normocephalic and atraumatic.      Nose: Nose normal.      Mouth/Throat:      Mouth: Mucous membranes are moist.      Pharynx: Oropharynx is clear.   Eyes:      Extraocular Movements: Extraocular movements intact.   Cardiovascular:      Rate and Rhythm: Normal rate and regular rhythm.      Pulses: Normal pulses.      Heart sounds: Normal heart sounds.   Pulmonary:      Effort: Pulmonary effort is normal.      Breath sounds: Normal breath sounds.   Musculoskeletal:         General: Normal range of " motion.      Cervical back: Normal range of motion.   Skin:     General: Skin is warm and dry.   Neurological:      General: No focal deficit present.      Mental Status: She is alert and oriented to person, place, and time. Mental status is at baseline.   Psychiatric:         Mood and Affect: Mood normal.     Current Outpatient Medications on File Prior to Visit   Medication Sig Dispense Refill    semaglutide, weight loss, 0.25 mg/0.5 mL PnIj Inject 0.25 mg into the skin every 7 days.      hydroCHLOROthiazide (HYDRODIURIL) 25 MG tablet Take 1 tablet (25 mg total) by mouth once daily. 90 tablet 3    levothyroxine (SYNTHROID) 50 MCG tablet Take 1 tablet (50 mcg total) by mouth before breakfast. 30 tablet 11    losartan (COZAAR) 25 MG tablet Take 1 tablet (25 mg total) by mouth once daily. 90 tablet 3    pantoprazole (PROTONIX) 40 MG tablet Take 40 mg by mouth.      paroxetine (PAXIL) 20 MG tablet Take 1 tablet (20 mg total) by mouth once daily. 90 tablet 3    pravastatin (PRAVACHOL) 40 MG tablet Take 1 tablet (40 mg total) by mouth once daily. 90 tablet 3    [DISCONTINUED] phentermine (ADIPEX-P) 37.5 mg tablet Take 1 tablet (37.5 mg total) by mouth before breakfast. 30 tablet 0    [DISCONTINUED] semaglutide (OZEMPIC) 0.25 mg or 0.5 mg(2 mg/1.5 mL) pen injector Inject 0.25 mg into the skin every 7 days. 1 pen 0     No current facility-administered medications on file prior to visit.     Health Maintenance   Topic Date Due    TETANUS VACCINE  10/25/2023 (Originally 3/27/1988)    Lipid Panel  11/01/2027    Hepatitis C Screening  Completed      Results for orders placed or performed in visit on 01/05/23    COLONOSCOPY   Result Value Ref Range    CRC Recommendation External No follow-up frequency specified           Assessment & Plan:     Active Problem List with Overview Notes    Diagnosis Date Noted    Prediabetes 12/28/2022    Hypothyroid 12/28/2022    Family history of systemic lupus erythematosus 12/28/2022     History of breast cancer 10/25/2022    H/O bilateral mastectomy 10/25/2022    Family history of diabetes mellitus 10/25/2022    Colon cancer screening 10/25/2022    Hyperlipidemia 10/25/2022    Hypertension 10/25/2022    Insomnia 10/25/2022    Class 2 severe obesity with serious comorbidity and body mass index (BMI) of 35.0 to 35.9 in adult 10/25/2022    Seasonal allergic rhinitis 10/25/2022    Tobacco user 10/25/2022    Stomach ulcer        1. Class 2 severe obesity with serious comorbidity and body mass index (BMI) of 35.0 to 35.9 in adult, unspecified obesity type  Assessment & Plan:  On adipex. Has lost 5# since lov.      Recently started on synthroid for hypothyroidism 12/2022. Will get labs today and adjust meds if needed.    Due to prediabetes,tried wegovy. Has 1 month supply.   Insurance did not cover so she paid out of pocket. Will get repeat labs. Will call with results when available. Reporting some nausea with med- zofran sent in.    Advised to contact us for concerns.  Patient is agreeable to plan and verbalized understanding      Orders:  -     ondansetron (ZOFRAN) 4 MG tablet; Take 1 tablet (4 mg total) by mouth every 12 (twelve) hours as needed for Nausea.  Dispense: 15 tablet; Refill: 1  -     Hemoglobin A1C; Future; Expected date: 02/24/2023  -     TSH; Future; Expected date: 02/24/2023  -     T4, Free; Future; Expected date: 02/24/2023  -     T4, Free; Future; Expected date: 02/24/2023  -     Thyroid Peroxidase Antibody; Future; Expected date: 02/24/2023    2. Prediabetes  Assessment & Plan:  Lab Results   Component Value Date    HGBA1C 6.3 (H) 11/01/2022     Encourage lifestyle change. monitor    Is on arb and statin.repeat labs ordered. Will call with results when available    Orders:  -     ondansetron (ZOFRAN) 4 MG tablet; Take 1 tablet (4 mg total) by mouth every 12 (twelve) hours as needed for Nausea.  Dispense: 15 tablet; Refill: 1  -     Hemoglobin A1C; Future; Expected date: 02/24/2023  -      TSH; Future; Expected date: 02/24/2023  -     T4, Free; Future; Expected date: 02/24/2023  -     T4, Free; Future; Expected date: 02/24/2023  -     Thyroid Peroxidase Antibody; Future; Expected date: 02/24/2023    3. Hypothyroidism, unspecified type  Assessment & Plan:  On synthroid 50mcg. Repeat labs ordered. Will call with results when available    Orders:  -     TSH; Future; Expected date: 02/24/2023  -     T4, Free; Future; Expected date: 02/24/2023  -     T4, Free; Future; Expected date: 02/24/2023  -     Thyroid Peroxidase Antibody; Future; Expected date: 02/24/2023    Other orders  -     phentermine (ADIPEX-P) 37.5 mg tablet; Take 1 tablet (37.5 mg total) by mouth before breakfast.  Dispense: 30 tablet; Refill: 0         Follow up in about 4 weeks (around 3/24/2023) for obesity.

## 2023-02-24 NOTE — ASSESSMENT & PLAN NOTE
On adipex. Has lost 5# since lov.      Recently started on synthroid for hypothyroidism 12/2022. Will get labs today and adjust meds if needed.    Due to prediabetes,tried wegovy. Has 1 month supply.   Insurance did not cover so she paid out of pocket. Will get repeat labs. Will call with results when available. Reporting some nausea with med- zofran sent in.    Advised to contact us for concerns.  Patient is agreeable to plan and verbalized understanding

## 2023-02-28 ENCOUNTER — TELEPHONE (OUTPATIENT)
Dept: FAMILY MEDICINE | Facility: CLINIC | Age: 53
End: 2023-02-28
Payer: COMMERCIAL

## 2023-02-28 DIAGNOSIS — E11.65 TYPE 2 DIABETES MELLITUS WITH HYPERGLYCEMIA, WITHOUT LONG-TERM CURRENT USE OF INSULIN: Primary | ICD-10-CM

## 2023-02-28 LAB
EST. AVERAGE GLUCOSE BLD GHB EST-MCNC: 137 MG/DL
HBA1C MFR BLD: 6.4 % (ref 4.8–5.6)
T3FREE SERPL-MCNC: 2.9 PG/ML (ref 2–4.4)
T4 FREE SERPL-MCNC: 1.67 NG/DL (ref 0.82–1.77)
THYROPEROXIDASE AB SERPL-ACNC: 193 IU/ML (ref 0–34)
TSH SERPL DL<=0.005 MIU/L-ACNC: 2.07 UIU/ML (ref 0.45–4.5)

## 2023-02-28 NOTE — PROGRESS NOTES
Relevant Problems   NEURO   (positive) History of pulmonary embolism and DVT, unprovoked.   (positive) History of ulcerative colitis   (positive) S/P ablation of atrial fibrillation      CARDIAC   (positive) Atrial fibrillation (HCC)   (positive) Pulmonary hypertension (HCC)      GI   (positive) Acid reflux disease   morbid obesity   Denies NASEEM      Physical Exam    Airway   Mallampati: IV  TM distance: >3 FB  Neck ROM: full       Cardiovascular   Rhythm: regular  Rate: normal     Dental    Pulmonary   Breath sounds clear to auscultation     Abdominal    Neurological - normal exam                 Anesthesia Plan    ASA 3   ASA physical status 3 criteria: other (comment)    Plan - general       Airway plan will be ETT          Induction: intravenous      Pertinent diagnostic labs and testing reviewed    Informed Consent:    Anesthetic plan and risks discussed with patient.         Please inform patient of results.    1. A1c is 6.4- she is now in dmii range. I will send in new rx with new dx so now her insurance should cover.  After she has completed 4 weeks of the 0.25mg SQ dose once weekly, she can increase to 0.5mg SQ dosing.  Rx sent in.  2. Tsh, free t3 and free t4 are wnl range. Continue on current synthroid dosing.  Her TPO antibodies are elevated indicating possible thyroiditis.  Will monitor. Consider endocrine referral    Other labwork within acceptable ranges.

## 2023-02-28 NOTE — TELEPHONE ENCOUNTER
----- Message from Yeny Workman LPN sent at 2/28/2023 10:52 AM CST -----  Patient requesting we send in script for wegovy and not ozempic since she has already started wegovy. She wants to know if she should go to a higher dose since she completed 4 weeks of the lowest dose? I told her I'd ask you first which medication you recommend she should take and the dosage.     Declines endocrine referral at this time

## 2023-02-28 NOTE — TELEPHONE ENCOUNTER
They are technically the same med- semaglutide.  I will send in higher dose of wegovy as requested though    I have signed for the following orders AND/OR meds.  Please call the patient and ask the patient to schedule the testing AND/OR inform about any medications that were sent.      Medications Ordered This Encounter   Medications    semaglutide, weight loss, 0.5 mg/0.5 mL PnIj     Sig: Inject 0.5 mg into the skin every 7 days.     Dispense:  4 mL     Refill:  0

## 2023-03-15 ENCOUNTER — TELEPHONE (OUTPATIENT)
Dept: FAMILY MEDICINE | Facility: CLINIC | Age: 53
End: 2023-03-15
Payer: COMMERCIAL

## 2023-03-15 DIAGNOSIS — E11.65 TYPE 2 DIABETES MELLITUS WITH HYPERGLYCEMIA, WITHOUT LONG-TERM CURRENT USE OF INSULIN: Primary | ICD-10-CM

## 2023-03-15 RX ORDER — TIRZEPATIDE 2.5 MG/.5ML
2.5 INJECTION, SOLUTION SUBCUTANEOUS
Qty: 4 PEN | Refills: 11 | Status: SHIPPED | OUTPATIENT
Start: 2023-03-15 | End: 2023-03-27

## 2023-03-15 NOTE — TELEPHONE ENCOUNTER
----- Message from [facility] sent at 3/15/2023 11:50 AM CDT -----  Regarding: meds  .Type:  Needs Medical Advice    Who Called:  pt     Pharmacy name and phone #:   [facility] in [place]    Would the patient rather a call back or a response via [facility]? Yes    Best Call Back Number: [phone]    Additional Information:  pt needs to get mook script with a prior authorization need before Ry 3-19-23

## 2023-03-15 NOTE — TELEPHONE ENCOUNTER
This patient does not currently have an active prescription for mounjaro, and I am not sure if they will cover for Pre-diabetes since she is pre-diabetic. If you place the order for the script I can try to see if the PA will be approved. Patient prefers [facility]

## 2023-03-15 NOTE — TELEPHONE ENCOUNTER
Her a1c 2/2023 was 6.4 placing her in dmii category so she should qualify for mounjaro now for dmii      I have signed for the following orders AND/OR meds.  Please call the patient and ask the patient to schedule the testing AND/OR inform about any medications that were sent.        Medications Ordered This Encounter   Medications    tirzepatide (MOUNJARO) 2.5 mg/0.5 mL PnIj     Sig: Inject 2.5 mg into the skin every 7 days.     Dispense:  4 pen     Refill:  11

## 2023-03-15 NOTE — TELEPHONE ENCOUNTER
----- Message from Nery Badillo sent at 3/14/2023 11:59 AM CDT -----  .Type:  Needs Medical Advice    Who Called: pt  Symptoms (please be specific):    How long has patient had these symptoms:    Pharmacy name and phone #:  [facility]  Would the patient rather a call back or a response via [facility]? C/b  Best Call Back Number:   Additional Information: mounjaro is covered but needs PA and needs to say its for diabetic and not for weight loss

## 2023-03-27 ENCOUNTER — OFFICE VISIT (OUTPATIENT)
Dept: FAMILY MEDICINE | Facility: CLINIC | Age: 53
End: 2023-03-27
Payer: COMMERCIAL

## 2023-03-27 VITALS
RESPIRATION RATE: 16 BRPM | WEIGHT: 192.13 LBS | HEART RATE: 67 BPM | OXYGEN SATURATION: 98 % | HEIGHT: 64 IN | TEMPERATURE: 98 F | SYSTOLIC BLOOD PRESSURE: 122 MMHG | BODY MASS INDEX: 32.8 KG/M2 | DIASTOLIC BLOOD PRESSURE: 84 MMHG

## 2023-03-27 DIAGNOSIS — E03.9 HYPOTHYROIDISM, UNSPECIFIED TYPE: ICD-10-CM

## 2023-03-27 DIAGNOSIS — E11.59 HYPERTENSION ASSOCIATED WITH DIABETES: ICD-10-CM

## 2023-03-27 DIAGNOSIS — E66.01 CLASS 2 SEVERE OBESITY WITH SERIOUS COMORBIDITY AND BODY MASS INDEX (BMI) OF 35.0 TO 35.9 IN ADULT, UNSPECIFIED OBESITY TYPE: Primary | ICD-10-CM

## 2023-03-27 DIAGNOSIS — E11.65 TYPE 2 DIABETES MELLITUS WITH HYPERGLYCEMIA, WITHOUT LONG-TERM CURRENT USE OF INSULIN: ICD-10-CM

## 2023-03-27 DIAGNOSIS — I15.2 HYPERTENSION ASSOCIATED WITH DIABETES: ICD-10-CM

## 2023-03-27 DIAGNOSIS — E66.9 CLASS 1 OBESITY WITH SERIOUS COMORBIDITY AND BODY MASS INDEX (BMI) OF 32.0 TO 32.9 IN ADULT, UNSPECIFIED OBESITY TYPE: ICD-10-CM

## 2023-03-27 DIAGNOSIS — E11.69 HYPERLIPIDEMIA ASSOCIATED WITH TYPE 2 DIABETES MELLITUS: ICD-10-CM

## 2023-03-27 DIAGNOSIS — E78.5 HYPERLIPIDEMIA ASSOCIATED WITH TYPE 2 DIABETES MELLITUS: ICD-10-CM

## 2023-03-27 DIAGNOSIS — E11.9 TYPE 2 DIABETES MELLITUS WITHOUT COMPLICATION, WITHOUT LONG-TERM CURRENT USE OF INSULIN: ICD-10-CM

## 2023-03-27 PROBLEM — R73.03 PREDIABETES: Status: RESOLVED | Noted: 2022-12-28 | Resolved: 2023-03-27

## 2023-03-27 PROCEDURE — 3044F HG A1C LEVEL LT 7.0%: CPT | Mod: CPTII,,, | Performed by: FAMILY MEDICINE

## 2023-03-27 PROCEDURE — 4010F PR ACE/ARB THEARPY RXD/TAKEN: ICD-10-PCS | Mod: CPTII,,, | Performed by: FAMILY MEDICINE

## 2023-03-27 PROCEDURE — 1159F MED LIST DOCD IN RCRD: CPT | Mod: CPTII,,, | Performed by: FAMILY MEDICINE

## 2023-03-27 PROCEDURE — 3008F BODY MASS INDEX DOCD: CPT | Mod: CPTII,,, | Performed by: FAMILY MEDICINE

## 2023-03-27 PROCEDURE — 99214 OFFICE O/P EST MOD 30 MIN: CPT | Mod: ,,, | Performed by: FAMILY MEDICINE

## 2023-03-27 PROCEDURE — 3074F SYST BP LT 130 MM HG: CPT | Mod: CPTII,,, | Performed by: FAMILY MEDICINE

## 2023-03-27 PROCEDURE — 1160F PR REVIEW ALL MEDS BY PRESCRIBER/CLIN PHARMACIST DOCUMENTED: ICD-10-PCS | Mod: CPTII,,, | Performed by: FAMILY MEDICINE

## 2023-03-27 PROCEDURE — 1159F PR MEDICATION LIST DOCUMENTED IN MEDICAL RECORD: ICD-10-PCS | Mod: CPTII,,, | Performed by: FAMILY MEDICINE

## 2023-03-27 PROCEDURE — 4010F ACE/ARB THERAPY RXD/TAKEN: CPT | Mod: CPTII,,, | Performed by: FAMILY MEDICINE

## 2023-03-27 PROCEDURE — 3008F PR BODY MASS INDEX (BMI) DOCUMENTED: ICD-10-PCS | Mod: CPTII,,, | Performed by: FAMILY MEDICINE

## 2023-03-27 PROCEDURE — 1160F RVW MEDS BY RX/DR IN RCRD: CPT | Mod: CPTII,,, | Performed by: FAMILY MEDICINE

## 2023-03-27 PROCEDURE — 3079F DIAST BP 80-89 MM HG: CPT | Mod: CPTII,,, | Performed by: FAMILY MEDICINE

## 2023-03-27 PROCEDURE — 3074F PR MOST RECENT SYSTOLIC BLOOD PRESSURE < 130 MM HG: ICD-10-PCS | Mod: CPTII,,, | Performed by: FAMILY MEDICINE

## 2023-03-27 PROCEDURE — 3079F PR MOST RECENT DIASTOLIC BLOOD PRESSURE 80-89 MM HG: ICD-10-PCS | Mod: CPTII,,, | Performed by: FAMILY MEDICINE

## 2023-03-27 PROCEDURE — 99214 PR OFFICE/OUTPT VISIT, EST, LEVL IV, 30-39 MIN: ICD-10-PCS | Mod: ,,, | Performed by: FAMILY MEDICINE

## 2023-03-27 PROCEDURE — 3044F PR MOST RECENT HEMOGLOBIN A1C LEVEL <7.0%: ICD-10-PCS | Mod: CPTII,,, | Performed by: FAMILY MEDICINE

## 2023-03-27 RX ORDER — TIRZEPATIDE 5 MG/.5ML
5 INJECTION, SOLUTION SUBCUTANEOUS
Qty: 4 PEN | Refills: 5 | Status: SHIPPED | OUTPATIENT
Start: 2023-03-27 | End: 2023-06-09

## 2023-03-27 RX ORDER — PHENTERMINE HYDROCHLORIDE 37.5 MG/1
37.5 TABLET ORAL
Qty: 30 TABLET | Refills: 0 | Status: SHIPPED | OUTPATIENT
Start: 2023-04-01 | End: 2023-04-24 | Stop reason: SDUPTHER

## 2023-03-27 RX ORDER — LEVOTHYROXINE SODIUM 75 UG/1
75 TABLET ORAL
Qty: 30 TABLET | Refills: 11 | Status: SHIPPED | OUTPATIENT
Start: 2023-03-27 | End: 2024-01-30 | Stop reason: SDUPTHER

## 2023-03-27 RX ORDER — LANCETS
EACH MISCELLANEOUS
Qty: 100 EACH | Refills: 11 | Status: SHIPPED | OUTPATIENT
Start: 2023-03-27

## 2023-03-27 RX ORDER — INSULIN PUMP SYRINGE, 3 ML
EACH MISCELLANEOUS
Qty: 1 EACH | Refills: 0 | Status: SHIPPED | OUTPATIENT
Start: 2023-03-27

## 2023-03-27 NOTE — ASSESSMENT & PLAN NOTE
On adipex. Has lost 8# since lov.  Asking to continue on adipex at least until stepson's wedding. Will continue to be seen monthly while on med. dea reviewed and refill sent in as requested.     on synthroid for hypothyroidism 12/2022.  Lab 2/2023 is improved. Still reporting fatigue.  Will increase to 75mcg daily. New rx sent in.   Lab Results   Component Value Date    TSH 2.070 02/27/2023       Due to prediabetes,tried wegovy. Paid out of pocket for 1 month supply.   Insurance did not cover.  Repeat labs 2/2023- new dx diabetes. mounjaro sent in.  Has been on 2.5mg x 2 weeks. Has lost weight.  Would like to continue.  Will complete 4 weeks total of mounjaro 2.5mg and then increase to 5mg.  New rx sent in.       Advised to contact us for concerns.  Patient is agreeable to plan and verbalized understanding

## 2023-03-27 NOTE — ASSESSMENT & PLAN NOTE
Lab Results   Component Value Date    TSH 2.070 02/27/2023     On synthroid 50mcg. Reports still feeling fatigued.  Would like to try increasing dose. New rx for 75mcg sent in.

## 2023-03-27 NOTE — PROGRESS NOTES
Subjective:        Patient ID: Cookie Membreno is a 53 y.o. female.    Chief Complaint: Follow-up (DM follow up /Patient would like glucose testing supplies )      Patient presents to clinic unaccompanied for follow up.  She is due for her wellness visit in October.       She is obese. Her stepson is getting  in 4/2023 and would like to lose weight before then.  She was previously on belviq which worked but was too expensive.  We started adipex at her lov 10/2022 x 2 months but has not lost weight.  We also tried synthroid due to new dx of hypothyroidism but has not lost weight.  Is exercising and eating well.  Would like to try another med if possible. Insurance would not cover wegovy or mounjaro so we sent in adipex again until we could find a medication.   At her visit 2/2023, She has lost 5#. She is on wegovy but had to pay out of pocket ($1400 for 1 month supply) because insurance would not cover- got on tuesday.  Exercising. Eating better.  We repeated labs and confirmed dx of diabetes.  So we sent in mounjaro.     She is here today for follow up. Has lost 8# since lov. Feels like she has a waist now.  Still working hard on her diet and exercise. Has done 2 shots of mounjaro 2.5mg.  tolerating well.  Asking to continue on adipex as well.         She is diabetic.  A1c was 6.4 2/2023. Urine micro ordered. Foot exam today. Eye exam .  On mounjaro 2.5mg x 2 weeks.       Her tsh was also elevated 10/2022.  +family history of thyroid issues in sisters.  Also reports family history of lupus in sister. Reports fatigue, dry hair and skin, and weight gain. We started her on synthroid 50mcg. Tsh 2/2023 wnl.  Reports still feeling fatigue and would like to increase dose.  Aletha recently dx with thyroid cancer.  Having surgery today     She has  HTN and HLD. Reports compliance with her meds.  Lisinopril caused cough.  Monitors her bp at home.      She has personal history of breast cancer dx 2015.  She  "had bilateral mastectomy and complete hysterectomy and bso.  She was on tamoxifen and had breast reconstruction.  After her hyst, dr. Salazar prescribed paxil for her hot flashes.  She follows with dr. Patino.     She reported increased anxiety/depression/stress/insomnia at her visit 2/2022.  Her older sister who had breast cancer passed and a younger brother was dx with sarcoidosis.  So we increased paxil to 20mg daily. Xanax was sent in prn panic attacks. Doing well.     She had a positive cologuard 6/2021 and had subsequent colonoscopy 7/2021 with dr. Asher loco     Had infected olecranon bursa.  Required surgery.  Doing much better now.      She drinks alcohol on occasion.  She declines immunizations    Review of Systems   Constitutional: Negative.    HENT: Negative.     Respiratory: Negative.     Cardiovascular: Negative.    Gastrointestinal: Negative.    Genitourinary: Negative.        Review of patient's allergies indicates:   Allergen Reactions    Cephalexin Hives, Itching and Shortness Of Breath    Hydrocodone     Propoxyphene n-acetaminophen       Vitals:    03/27/23 0946   BP: 122/84   BP Location: Left arm   Pulse: 67   Resp: 16   Temp: 98 °F (36.7 °C)   TempSrc: Temporal   SpO2: 98%   Weight: 87.1 kg (192 lb 1.6 oz)   Height: 5' 4" (1.626 m)      Social History     Socioeconomic History    Marital status:    Tobacco Use    Smoking status: Former     Types: Vaping with nicotine    Smokeless tobacco: Never   Substance and Sexual Activity    Alcohol use: Yes     Alcohol/week: 1.0 standard drink     Types: 1 Drinks containing 0.5 oz of alcohol per week    Drug use: Never    Sexual activity: Yes     Partners: Male     Birth control/protection: None      History reviewed. No pertinent family history.       Objective:     Physical Exam  Vitals and nursing note reviewed.   Constitutional:       Appearance: Normal appearance. She is obese.   HENT:      Head: Normocephalic and atraumatic.      Nose: Nose " normal.      Mouth/Throat:      Mouth: Mucous membranes are moist.      Pharynx: Oropharynx is clear.   Eyes:      Extraocular Movements: Extraocular movements intact.   Cardiovascular:      Rate and Rhythm: Normal rate and regular rhythm.      Pulses: Normal pulses.      Heart sounds: Normal heart sounds.   Pulmonary:      Effort: Pulmonary effort is normal.      Breath sounds: Normal breath sounds.   Musculoskeletal:         General: Normal range of motion.      Cervical back: Normal range of motion.   Skin:     General: Skin is warm and dry.   Neurological:      General: No focal deficit present.      Mental Status: She is alert and oriented to person, place, and time. Mental status is at baseline.   Psychiatric:         Mood and Affect: Mood normal.     Current Outpatient Medications on File Prior to Visit   Medication Sig Dispense Refill    hydroCHLOROthiazide (HYDRODIURIL) 25 MG tablet Take 1 tablet (25 mg total) by mouth once daily. 90 tablet 3    losartan (COZAAR) 25 MG tablet Take 1 tablet (25 mg total) by mouth once daily. 90 tablet 3    ondansetron (ZOFRAN) 4 MG tablet Take 1 tablet (4 mg total) by mouth every 12 (twelve) hours as needed for Nausea. 15 tablet 1    pantoprazole (PROTONIX) 40 MG tablet Take 40 mg by mouth.      paroxetine (PAXIL) 20 MG tablet Take 1 tablet (20 mg total) by mouth once daily. 90 tablet 3    pravastatin (PRAVACHOL) 40 MG tablet Take 1 tablet (40 mg total) by mouth once daily. 90 tablet 3    [DISCONTINUED] levothyroxine (SYNTHROID) 50 MCG tablet Take 1 tablet (50 mcg total) by mouth before breakfast. 30 tablet 11    [DISCONTINUED] phentermine (ADIPEX-P) 37.5 mg tablet Take 1 tablet (37.5 mg total) by mouth before breakfast. 30 tablet 0    [DISCONTINUED] tirzepatide (MOUNJARO) 2.5 mg/0.5 mL PnIj Inject 2.5 mg into the skin every 7 days. 4 pen 11     No current facility-administered medications on file prior to visit.     Health Maintenance   Topic Date Due    TETANUS VACCINE   10/25/2023 (Originally 3/27/1988)    Lipid Panel  11/01/2027    Hepatitis C Screening  Completed      Results for orders placed or performed in visit on 02/27/23   Hgb A1c w/ eAG Estimation   Result Value Ref Range    Hemoglobin A1c 6.4 (H) 4.8 - 5.6 %    Estimated Avg Glucose 137 mg/dL   T4, Free   Result Value Ref Range    T4, Free 1.67 0.82 - 1.77 ng/dL   TSH   Result Value Ref Range    TSH 2.070 0.450 - 4.500 uIU/mL   Thyroid Peroxidase Antibody   Result Value Ref Range    Thyroid Peroxidase Ab 193 (H) 0 - 34 IU/mL   T3, Free   Result Value Ref Range    T3, Free 2.9 2.0 - 4.4 pg/mL          Assessment & Plan:     Active Problem List with Overview Notes    Diagnosis Date Noted    Diabetes 03/27/2023    Hypothyroid 12/28/2022    Family history of systemic lupus erythematosus 12/28/2022    History of breast cancer 10/25/2022    H/O bilateral mastectomy 10/25/2022    Family history of diabetes mellitus 10/25/2022    Colon cancer screening 10/25/2022    Hyperlipidemia associated with type 2 diabetes mellitus 10/25/2022    Hypertension associated with diabetes 10/25/2022    Insomnia 10/25/2022    Class 1 obesity with serious comorbidity and body mass index (BMI) of 32.0 to 32.9 in adult 10/25/2022    Seasonal allergic rhinitis 10/25/2022    Tobacco user 10/25/2022    Stomach ulcer        1. Class 2 severe obesity with serious comorbidity and body mass index (BMI) of 35.0 to 35.9 in adult, unspecified obesity type  Assessment & Plan:  On adipex. Has lost 8# since lov.  Asking to continue on adipex at least until stepson's wedding. Will continue to be seen monthly while on med. [facility] reviewed and refill sent in as requested.     on synthroid for hypothyroidism 12/2022.  Lab 2/2023 is improved. Still reporting fatigue.  Will increase to 75mcg daily. New rx sent in.   Lab Results   Component Value Date    TSH 2.070 02/27/2023       Due to prediabetes,tried wegovy. Paid out of pocket for 1 month supply.   Insurance did  not cover.  Repeat labs 2/2023- new dx diabetes. mounjaro sent in.  Has been on 2.5mg x 2 weeks. Has lost weight.  Would like to continue.  Will complete 4 weeks total of mounjaro 2.5mg and then increase to 5mg.  New rx sent in.       Advised to contact us for concerns.  Patient is agreeable to plan and verbalized understanding      Orders:  -     phentermine (ADIPEX-P) 37.5 mg tablet; Take 1 tablet (37.5 mg total) by mouth before breakfast.  Dispense: 30 tablet; Refill: 0  -     blood-glucose meter kit; To check BG two times daily, to use with insurance preferred meter  Dispense: 1 each; Refill: 0  -     lancets Misc; To check BG two times daily, to use with insurance preferred meter  Dispense: 100 each; Refill: 11  -     blood sugar diagnostic Strp; To check BG two times daily, to use with insurance preferred meter  Dispense: 100 each; Refill: 11    2. Type 2 diabetes mellitus without complication, without long-term current use of insulin  Assessment & Plan:  Lab Results   Component Value Date    HGBA1C 6.4 (H) 02/27/2023     Urine micro ordered  Foot exam - at next in office visit  Eye exam    On mounjaro statin.    Testing supplies sent in    Orders:  -     tirzepatide (MOUNJARO) 5 mg/0.5 mL PnIj; Inject 5 mg into the skin every 7 days.  Dispense: 4 pen; Refill: 5  -     blood-glucose meter kit; To check BG two times daily, to use with insurance preferred meter  Dispense: 1 each; Refill: 0  -     lancets Misc; To check BG two times daily, to use with insurance preferred meter  Dispense: 100 each; Refill: 11  -     blood sugar diagnostic Strp; To check BG two times daily, to use with insurance preferred meter  Dispense: 100 each; Refill: 11    3. Type 2 diabetes mellitus with hyperglycemia, without long-term current use of insulin  Assessment & Plan:  Lab Results   Component Value Date    HGBA1C 6.4 (H) 02/27/2023     Urine micro ordered  Foot exam - at next in office visit  Eye exam    On [facility]  statin.    Testing supplies sent in    Orders:  -     tirzepatide (MOUNJARO) 5 mg/0.5 mL PnIj; Inject 5 mg into the skin every 7 days.  Dispense: 4 pen; Refill: 5  -     blood-glucose meter kit; To check BG two times daily, to use with insurance preferred meter  Dispense: 1 each; Refill: 0  -     lancets Misc; To check BG two times daily, to use with insurance preferred meter  Dispense: 100 each; Refill: 11  -     blood sugar diagnostic Strp; To check BG two times daily, to use with insurance preferred meter  Dispense: 100 each; Refill: 11    4. Hypothyroidism, unspecified type  Assessment & Plan:  Lab Results   Component Value Date    TSH 2.070 02/27/2023     On synthroid 50mcg. Reports still feeling fatigued.  Would like to try increasing dose. New rx for 75mcg sent in.    Orders:  -     levothyroxine (SYNTHROID) 75 MCG tablet; Take 1 tablet (75 mcg total) by mouth before breakfast.  Dispense: 30 tablet; Refill: 11    5. Hypertension associated with diabetes  Assessment & Plan:  Well controlled on current prescription meds. Not on asa due to GI      6. Hyperlipidemia associated with type 2 diabetes mellitus  Assessment & Plan:  On statin      7. Class 1 obesity with serious comorbidity and body mass index (BMI) of 32.0 to 32.9 in adult, unspecified obesity type  Assessment & Plan:  On adipex. Has lost 8# since lov.  Asking to continue on adipex at least until stepson's wedding. Will continue to be seen monthly while on med. dea reviewed and refill sent in as requested.     on synthroid for hypothyroidism 12/2022.  Lab 2/2023 is improved. Still reporting fatigue.  Will increase to 75mcg daily. New rx sent in.   Lab Results   Component Value Date    TSH 2.070 02/27/2023       Due to prediabetes,tried wegovy. Paid out of pocket for 1 month supply.   Insurance did not cover.  Repeat labs 2/2023- new dx diabetes. mounjaro sent in.  Has been on 2.5mg x 2 weeks. Has lost weight.  Would like to continue.  Will  complete 4 weeks total of mounjaro 2.5mg and then increase to 5mg.  New rx sent in.       Advised to contact us for concerns.  Patient is agreeable to plan and verbalized understanding             Follow up in about 4 weeks (around 4/24/2023) for obesity.

## 2023-03-27 NOTE — ASSESSMENT & PLAN NOTE
Lab Results   Component Value Date    HGBA1C 6.4 (H) 02/27/2023     Urine micro ordered  Foot exam - at next in office visit  Eye exam    On mounjaro, statin.    Testing supplies sent in

## 2023-04-24 ENCOUNTER — CLINICAL SUPPORT (OUTPATIENT)
Dept: FAMILY MEDICINE | Facility: CLINIC | Age: 53
End: 2023-04-24
Attending: FAMILY MEDICINE
Payer: COMMERCIAL

## 2023-04-24 ENCOUNTER — OFFICE VISIT (OUTPATIENT)
Dept: FAMILY MEDICINE | Facility: CLINIC | Age: 53
End: 2023-04-24
Payer: COMMERCIAL

## 2023-04-24 VITALS
OXYGEN SATURATION: 99 % | HEART RATE: 68 BPM | DIASTOLIC BLOOD PRESSURE: 76 MMHG | TEMPERATURE: 98 F | SYSTOLIC BLOOD PRESSURE: 118 MMHG | WEIGHT: 179.69 LBS | RESPIRATION RATE: 16 BRPM | HEIGHT: 64 IN | BODY MASS INDEX: 30.68 KG/M2

## 2023-04-24 DIAGNOSIS — E11.9 TYPE 2 DIABETES MELLITUS WITHOUT COMPLICATION, WITHOUT LONG-TERM CURRENT USE OF INSULIN: ICD-10-CM

## 2023-04-24 DIAGNOSIS — E78.5 HYPERLIPIDEMIA ASSOCIATED WITH TYPE 2 DIABETES MELLITUS: ICD-10-CM

## 2023-04-24 DIAGNOSIS — E11.69 HYPERLIPIDEMIA ASSOCIATED WITH TYPE 2 DIABETES MELLITUS: ICD-10-CM

## 2023-04-24 DIAGNOSIS — E03.9 HYPOTHYROIDISM, UNSPECIFIED TYPE: ICD-10-CM

## 2023-04-24 DIAGNOSIS — I15.2 HYPERTENSION ASSOCIATED WITH DIABETES: ICD-10-CM

## 2023-04-24 DIAGNOSIS — E11.59 HYPERTENSION ASSOCIATED WITH DIABETES: ICD-10-CM

## 2023-04-24 DIAGNOSIS — E66.9 CLASS 1 OBESITY WITH SERIOUS COMORBIDITY AND BODY MASS INDEX (BMI) OF 30.0 TO 30.9 IN ADULT, UNSPECIFIED OBESITY TYPE: ICD-10-CM

## 2023-04-24 DIAGNOSIS — E11.9 TYPE 2 DIABETES MELLITUS WITHOUT COMPLICATION, WITHOUT LONG-TERM CURRENT USE OF INSULIN: Primary | ICD-10-CM

## 2023-04-24 PROCEDURE — 92228 PR REMOTE IMAGE RETINA, MONITOR/MANAGE ACTIVE DISEASE: ICD-10-PCS | Mod: TC,,, | Performed by: FAMILY MEDICINE

## 2023-04-24 PROCEDURE — 3008F BODY MASS INDEX DOCD: CPT | Mod: CPTII,,, | Performed by: FAMILY MEDICINE

## 2023-04-24 PROCEDURE — 3044F HG A1C LEVEL LT 7.0%: CPT | Mod: CPTII,,, | Performed by: FAMILY MEDICINE

## 2023-04-24 PROCEDURE — 1159F PR MEDICATION LIST DOCUMENTED IN MEDICAL RECORD: ICD-10-PCS | Mod: CPTII,,, | Performed by: FAMILY MEDICINE

## 2023-04-24 PROCEDURE — 2025F PR 7 STANDARD FLD STEREO RETINAL PHOTOS W/O EVID OF RETINOPATHY W/INTERPT BY OPHTH/OPT: ICD-10-PCS | Mod: CPTII,,, | Performed by: FAMILY MEDICINE

## 2023-04-24 PROCEDURE — 3044F PR MOST RECENT HEMOGLOBIN A1C LEVEL <7.0%: ICD-10-PCS | Mod: CPTII,,, | Performed by: FAMILY MEDICINE

## 2023-04-24 PROCEDURE — 4010F PR ACE/ARB THEARPY RXD/TAKEN: ICD-10-PCS | Mod: CPTII,,, | Performed by: FAMILY MEDICINE

## 2023-04-24 PROCEDURE — 1159F MED LIST DOCD IN RCRD: CPT | Mod: CPTII,,, | Performed by: FAMILY MEDICINE

## 2023-04-24 PROCEDURE — 92228 IMG RTA DETC/MNTR DS PHY/QHP: CPT | Mod: TC,,, | Performed by: FAMILY MEDICINE

## 2023-04-24 PROCEDURE — 4010F ACE/ARB THERAPY RXD/TAKEN: CPT | Mod: CPTII,,, | Performed by: FAMILY MEDICINE

## 2023-04-24 PROCEDURE — 3008F PR BODY MASS INDEX (BMI) DOCUMENTED: ICD-10-PCS | Mod: CPTII,,, | Performed by: FAMILY MEDICINE

## 2023-04-24 PROCEDURE — 1160F PR REVIEW ALL MEDS BY PRESCRIBER/CLIN PHARMACIST DOCUMENTED: ICD-10-PCS | Mod: CPTII,,, | Performed by: FAMILY MEDICINE

## 2023-04-24 PROCEDURE — 3078F PR MOST RECENT DIASTOLIC BLOOD PRESSURE < 80 MM HG: ICD-10-PCS | Mod: CPTII,,, | Performed by: FAMILY MEDICINE

## 2023-04-24 PROCEDURE — 3074F SYST BP LT 130 MM HG: CPT | Mod: CPTII,,, | Performed by: FAMILY MEDICINE

## 2023-04-24 PROCEDURE — 3074F PR MOST RECENT SYSTOLIC BLOOD PRESSURE < 130 MM HG: ICD-10-PCS | Mod: CPTII,,, | Performed by: FAMILY MEDICINE

## 2023-04-24 PROCEDURE — 3078F DIAST BP <80 MM HG: CPT | Mod: CPTII,,, | Performed by: FAMILY MEDICINE

## 2023-04-24 PROCEDURE — 1160F RVW MEDS BY RX/DR IN RCRD: CPT | Mod: CPTII,,, | Performed by: FAMILY MEDICINE

## 2023-04-24 PROCEDURE — 92228 IMG RTA DETC/MNTR DS PHY/QHP: CPT | Mod: 26,,, | Performed by: OPHTHALMOLOGY

## 2023-04-24 PROCEDURE — 2025F 7 FLD RTA PHOTO W/O RTNOPTHY: CPT | Mod: CPTII,,, | Performed by: FAMILY MEDICINE

## 2023-04-24 PROCEDURE — 99214 OFFICE O/P EST MOD 30 MIN: CPT | Mod: ,,, | Performed by: FAMILY MEDICINE

## 2023-04-24 PROCEDURE — 99214 PR OFFICE/OUTPT VISIT, EST, LEVL IV, 30-39 MIN: ICD-10-PCS | Mod: ,,, | Performed by: FAMILY MEDICINE

## 2023-04-24 PROCEDURE — 92228 DIABETIC EYE SCREENING PHOTO: ICD-10-PCS | Mod: 26,,, | Performed by: OPHTHALMOLOGY

## 2023-04-24 RX ORDER — PHENTERMINE HYDROCHLORIDE 37.5 MG/1
37.5 TABLET ORAL
Qty: 30 TABLET | Refills: 0 | Status: SHIPPED | OUTPATIENT
Start: 2023-04-24 | End: 2023-05-24

## 2023-04-24 RX ORDER — GLUCOSAM/CHON-MSM1/C/MANG/BOSW 500-416.6
TABLET ORAL 2 TIMES DAILY
COMMUNITY
Start: 2023-03-27

## 2023-04-24 RX ORDER — BLOOD-GLUCOSE METER
EACH MISCELLANEOUS
COMMUNITY
Start: 2023-03-27

## 2023-04-24 NOTE — ASSESSMENT & PLAN NOTE
Lab Results   Component Value Date    HGBA1C 6.4 (H) 02/27/2023     Urine micro ordered  Foot exam -today  Eye exam in office today    On mounjaro, statin, arb. Lisinopril caused cough.      Repeat labs prior to next appt. Orders in.

## 2023-04-24 NOTE — PROGRESS NOTES
Subjective:        Patient ID: Cookie Membreno is a 53 y.o. female.    Chief Complaint: Follow-up (1 month follow up )      Patient presents to clinic unaccompanied for follow up.  She is due for her wellness visit in October.       She is obese. Her stepson is getting  in 4/2023 and would like to lose weight before then.  She was previously on belviq which worked but was too expensive.  We started adipex at her lov 10/2022 x 2 months but has not lost weight.  We also tried synthroid due to new dx of hypothyroidism but has not lost weight.  Is exercising and eating well.  Would like to try another med if possible. Insurance would not cover wegovy or mounjaro so we sent in adipex again until we could find a medication.   At her visit 2/2023, She has lost 5#. She is on wegovy but had to pay out of pocket ($1400 for 1 month supply) because insurance would not cover- got on tuesday.  Exercising. Eating better.  We repeated labs and confirmed dx of diabetes.  So we sent in mounjaro.      At her follow up on 3/27/23, she had lost 8# since lov. Feels like she has a waist now.  Still working hard on her diet and exercise. Has done 2 shots of mounjaro 2.5mg.  tolerating well.  Asking to continue on adipex as well.      She is here today for follow up.  Had wedding this past weekend.  She has lost 13# since lov.  Feeling good.  Would like to continue adipex for 1 more month to hopefully reach her goal weight of 155#. On mounjaro 2.5mg.        She is diabetic.  A1c was 6.4 2/2023. Urine micro ordered. Foot exam today. Eye exam in office today.  On mounjaro 2.5 mg x 4 weeks.  Has not started on 5mg yet.  Did  from pharmacy.  Cbgs 136 max.     Has hypothyroidism. On synthroid 75mcg daily.  Tsh 2/2023 wnl.  Aletha recently dx with thyroid cancer.       She has  HTN and HLD. Reports compliance with her meds.  Lisinopril caused cough.  Monitors her bp at home.      She has personal history of breast cancer  "dx 2015.  She had bilateral mastectomy and complete hysterectomy and bso.  She was on tamoxifen and had breast reconstruction.  After her hyst, dr. Salazar prescribed paxil for her hot flashes.  She follows with dr. Patino.     She reported increased anxiety/depression/stress/insomnia at her visit 2/2022.  Her older sister who had breast cancer passed and a younger brother was dx with sarcoidosis.  So we increased paxil to 20mg daily. Xanax was sent in prn panic attacks. Doing well.     She had a positive cologuard 6/2021 and had subsequent colonoscopy 7/2021 with dr. Asher loco     Had infected olecranon bursa.  Required surgery.  Doing much better now.      She drinks alcohol on occasion.  She declines immunizations    Review of Systems   Constitutional: Negative.    HENT: Negative.     Respiratory: Negative.     Cardiovascular: Negative.    Gastrointestinal: Negative.    Genitourinary: Negative.        Review of patient's allergies indicates:   Allergen Reactions    Cephalexin Hives, Itching and Shortness Of Breath    Hydrocodone     Propoxyphene n-acetaminophen       Vitals:    04/24/23 0914   BP: 118/76   BP Location: Left arm   Pulse: 68   Resp: 16   Temp: 98.1 °F (36.7 °C)   TempSrc: Temporal   SpO2: 99%   Weight: 81.5 kg (179 lb 11.2 oz)   Height: 5' 4" (1.626 m)      Social History     Socioeconomic History    Marital status:    Tobacco Use    Smoking status: Former     Types: Vaping with nicotine    Smokeless tobacco: Never   Substance and Sexual Activity    Alcohol use: Yes     Alcohol/week: 1.0 standard drink     Types: 1 Drinks containing 0.5 oz of alcohol per week    Drug use: Never    Sexual activity: Yes     Partners: Male     Birth control/protection: None      History reviewed. No pertinent family history.       Objective:     Physical Exam  Vitals and nursing note reviewed.   Constitutional:       Appearance: Normal appearance. She is obese.   HENT:      Head: Normocephalic and atraumatic. "      Nose: Nose normal.      Mouth/Throat:      Mouth: Mucous membranes are moist.      Pharynx: Oropharynx is clear.   Eyes:      Extraocular Movements: Extraocular movements intact.   Cardiovascular:      Rate and Rhythm: Normal rate and regular rhythm.      Pulses: Normal pulses.           Dorsalis pedis pulses are 2+ on the right side and 2+ on the left side.        Posterior tibial pulses are 2+ on the right side and 2+ on the left side.      Heart sounds: Normal heart sounds.   Pulmonary:      Effort: Pulmonary effort is normal.      Breath sounds: Normal breath sounds.   Musculoskeletal:         General: Normal range of motion.      Cervical back: Normal range of motion.        Feet:    Feet:      Right foot:      Protective Sensation: 8 sites tested.  8 sites sensed.      Skin integrity: Skin integrity normal.      Left foot:      Protective Sensation: 8 sites tested.  8 sites sensed.      Skin integrity: Skin integrity normal.   Skin:     General: Skin is warm and dry.   Neurological:      General: No focal deficit present.      Mental Status: She is alert and oriented to person, place, and time. Mental status is at baseline.   Psychiatric:         Mood and Affect: Mood normal.     Current Outpatient Medications on File Prior to Visit   Medication Sig Dispense Refill    blood sugar diagnostic Strp To check BG two times daily, to use with insurance preferred meter 100 each 11    blood-glucose meter kit To check BG two times daily, to use with insurance preferred meter 1 each 0    hydroCHLOROthiazide (HYDRODIURIL) 25 MG tablet Take 1 tablet (25 mg total) by mouth once daily. 90 tablet 3    lancets Misc To check BG two times daily, to use with insurance preferred meter 100 each 11    levothyroxine (SYNTHROID) 75 MCG tablet Take 1 tablet (75 mcg total) by mouth before breakfast. 30 tablet 11    losartan (COZAAR) 25 MG tablet Take 1 tablet (25 mg total) by mouth once daily. 90 tablet 3    ondansetron (ZOFRAN) 4  MG tablet Take 1 tablet (4 mg total) by mouth every 12 (twelve) hours as needed for Nausea. 15 tablet 1    pantoprazole (PROTONIX) 40 MG tablet Take 40 mg by mouth.      paroxetine (PAXIL) 20 MG tablet Take 1 tablet (20 mg total) by mouth once daily. 90 tablet 3    pravastatin (PRAVACHOL) 40 MG tablet Take 1 tablet (40 mg total) by mouth once daily. 90 tablet 3    tirzepatide (MOUNJARO) 5 mg/0.5 mL PnIj Inject 5 mg into the skin every 7 days. 4 pen 5    TRUE METRIX GLUCOSE METER Misc USE TO check blood glucose      TRUEPLUS LANCETS 28 gauge Misc Apply topically 2 (two) times daily.      [DISCONTINUED] phentermine (ADIPEX-P) 37.5 mg tablet Take 1 tablet (37.5 mg total) by mouth before breakfast. 30 tablet 0     No current facility-administered medications on file prior to visit.     Health Maintenance   Topic Date Due    Foot Exam  Never done    Eye Exam  Never done    Low Dose Statin  Never done    TETANUS VACCINE  10/25/2023 (Originally 3/27/1988)    Hemoglobin A1c  08/27/2023    Lipid Panel  11/01/2023    Hepatitis C Screening  Completed      Results for orders placed or performed in visit on 02/27/23   Hgb A1c w/ eAG Estimation   Result Value Ref Range    Hemoglobin A1c 6.4 (H) 4.8 - 5.6 %    Estimated Avg Glucose 137 mg/dL   T4, Free   Result Value Ref Range    T4, Free 1.67 0.82 - 1.77 ng/dL   TSH   Result Value Ref Range    TSH 2.070 0.450 - 4.500 uIU/mL   Thyroid Peroxidase Antibody   Result Value Ref Range    Thyroid Peroxidase Ab 193 (H) 0 - 34 IU/mL   T3, Free   Result Value Ref Range    T3, Free 2.9 2.0 - 4.4 pg/mL          Assessment & Plan:     Active Problem List with Overview Notes    Diagnosis Date Noted    Diabetes 03/27/2023    Hypothyroid 12/28/2022    Family history of systemic lupus erythematosus 12/28/2022    History of breast cancer 10/25/2022    H/O bilateral mastectomy 10/25/2022    Family history of diabetes mellitus 10/25/2022    Colon cancer screening 10/25/2022    Hyperlipidemia  associated with type 2 diabetes mellitus 10/25/2022    Hypertension associated with diabetes 10/25/2022    Insomnia 10/25/2022    Class 1 obesity with serious comorbidity and body mass index (BMI) of 30.0 to 30.9 in adult 10/25/2022    Seasonal allergic rhinitis 10/25/2022    Tobacco user 10/25/2022    Stomach ulcer        1. Type 2 diabetes mellitus without complication, without long-term current use of insulin  Assessment & Plan:  Lab Results   Component Value Date    HGBA1C 6.4 (H) 02/27/2023     Urine micro ordered  Foot exam -today  Eye exam in office today    On mounjaro, statin, arb. Lisinopril caused cough.      Repeat labs prior to next appt. Orders in.    Orders:  -     Comprehensive Metabolic Panel; Future; Expected date: 04/24/2023  -     TSH; Future; Expected date: 04/24/2023  -     Hemoglobin A1C; Future; Expected date: 04/24/2023  -     Microalbumin/Creatinine Ratio, Urine; Future; Expected date: 04/24/2023  -     Urinalysis; Future; Expected date: 04/24/2023  -     Diabetic Eye Screening Photo; Future    2. Hypertension associated with diabetes  Assessment & Plan:  Well controlled on current prescription meds. Not on asa due to GI    Orders:  -     Comprehensive Metabolic Panel; Future; Expected date: 04/24/2023  -     TSH; Future; Expected date: 04/24/2023  -     Hemoglobin A1C; Future; Expected date: 04/24/2023  -     Microalbumin/Creatinine Ratio, Urine; Future; Expected date: 04/24/2023  -     Urinalysis; Future; Expected date: 04/24/2023    3. Hyperlipidemia associated with type 2 diabetes mellitus  Assessment & Plan:  On statin    Orders:  -     Comprehensive Metabolic Panel; Future; Expected date: 04/24/2023  -     TSH; Future; Expected date: 04/24/2023  -     Hemoglobin A1C; Future; Expected date: 04/24/2023  -     Microalbumin/Creatinine Ratio, Urine; Future; Expected date: 04/24/2023  -     Urinalysis; Future; Expected date: 04/24/2023    4. Hypothyroidism, unspecified type  Assessment &  Plan:  Lab Results   Component Value Date    TSH 2.070 02/27/2023     On synthroid 75mcg. Repeat labs in to be completed before next appt.    Orders:  -     Comprehensive Metabolic Panel; Future; Expected date: 04/24/2023  -     TSH; Future; Expected date: 04/24/2023  -     Hemoglobin A1C; Future; Expected date: 04/24/2023  -     Microalbumin/Creatinine Ratio, Urine; Future; Expected date: 04/24/2023  -     Urinalysis; Future; Expected date: 04/24/2023    5. Class 1 obesity with serious comorbidity and body mass index (BMI) of 30.0 to 30.9 in adult, unspecified obesity type  Assessment & Plan:  Has lost 13# since. Encouraged continued lifestyle change. Will continue adipex x 1 more month only.  Continue on mounjaro for dmii and weight loss.  Continue to monitor tsh.  Will follow up in 1 month or sooner if needed.       Orders:  -     phentermine (ADIPEX-P) 37.5 mg tablet; Take 1 tablet (37.5 mg total) by mouth before breakfast.  Dispense: 30 tablet; Refill: 0         Follow up in about 4 weeks (around 5/22/2023) for obesity, diabetes with labs.

## 2023-04-24 NOTE — ASSESSMENT & PLAN NOTE
Has lost 13# since. Encouraged continued lifestyle change. Will continue adipex x 1 more month only.  Continue on mounjaro for dmii and weight loss.  Continue to monitor tsh.  Will follow up in 1 month or sooner if needed.

## 2023-04-24 NOTE — ASSESSMENT & PLAN NOTE
Lab Results   Component Value Date    TSH 2.070 02/27/2023     On synthroid 75mcg. Repeat labs in to be completed before next appt.

## 2023-04-24 NOTE — PROGRESS NOTES
Cookie Membreno is a 53 y.o. female here for a diabetic eye screening with non-dilated fundus photos per Dr. Levin.    Patient cooperative?: Yes  Small pupils?: Yes  Last eye exam: N/A    For exam results, see Encounter Report.

## 2023-06-09 ENCOUNTER — TELEPHONE (OUTPATIENT)
Dept: FAMILY MEDICINE | Facility: CLINIC | Age: 53
End: 2023-06-09
Payer: COMMERCIAL

## 2023-06-09 DIAGNOSIS — E11.9 TYPE 2 DIABETES MELLITUS WITHOUT COMPLICATION, WITHOUT LONG-TERM CURRENT USE OF INSULIN: ICD-10-CM

## 2023-06-09 DIAGNOSIS — E11.65 TYPE 2 DIABETES MELLITUS WITH HYPERGLYCEMIA, WITHOUT LONG-TERM CURRENT USE OF INSULIN: ICD-10-CM

## 2023-06-09 NOTE — TELEPHONE ENCOUNTER
----- Message from Maribel Woo sent at 6/8/2023  5:01 PM CDT -----  Regarding: Med  .Type:  RX Refill Request    Who Called: Pt  Refill or New Rx:Refill  RX Name and Strength:tirzepatide (MOUNJARO) 5 mg/0.5 mL PnIj  How is the patient currently taking it? (ex. 1XDay)  Is this a 30 day or 90 day RX:  Preferred Pharmacy with phone number:[id number] [place] [id number]  Local or Mail Order:Local  Ordering Provider:Poonam  Would the patient rather a call back or a response via [facility]? Call back  Best Call Back Number:[phone]  Additional Information: wants to increase the dosage

## 2023-06-09 NOTE — TELEPHONE ENCOUNTER
Please make sure she has completed 4 shots (1 month) at 5mg before increasing to 7.5mg. rx sent in      I have signed for the following orders AND/OR meds.  Please call the patient and ask the patient to schedule the testing AND/OR inform about any medications that were sent.        Medications Ordered This Encounter   Medications    tirzepatide 7.5 mg/0.5 mL PnIj     Sig: Inject 7.5 mg into the skin every 7 days.     Dispense:  4 pen     Refill:  11

## 2023-07-05 ENCOUNTER — OFFICE VISIT (OUTPATIENT)
Dept: FAMILY MEDICINE | Facility: CLINIC | Age: 53
End: 2023-07-05
Payer: COMMERCIAL

## 2023-07-05 ENCOUNTER — TELEPHONE (OUTPATIENT)
Dept: FAMILY MEDICINE | Facility: CLINIC | Age: 53
End: 2023-07-05

## 2023-07-05 VITALS
OXYGEN SATURATION: 98 % | HEART RATE: 93 BPM | DIASTOLIC BLOOD PRESSURE: 71 MMHG | SYSTOLIC BLOOD PRESSURE: 113 MMHG | WEIGHT: 164.13 LBS | HEIGHT: 64 IN | RESPIRATION RATE: 18 BRPM | BODY MASS INDEX: 28.02 KG/M2

## 2023-07-05 DIAGNOSIS — E11.59 HYPERTENSION ASSOCIATED WITH DIABETES: ICD-10-CM

## 2023-07-05 DIAGNOSIS — F41.9 ANXIOUS MOOD: ICD-10-CM

## 2023-07-05 DIAGNOSIS — E66.3 OVERWEIGHT (BMI 25.0-29.9): ICD-10-CM

## 2023-07-05 DIAGNOSIS — S91.311A LACERATION OF RIGHT FOOT, INITIAL ENCOUNTER: ICD-10-CM

## 2023-07-05 DIAGNOSIS — I15.2 HYPERTENSION ASSOCIATED WITH DIABETES: ICD-10-CM

## 2023-07-05 DIAGNOSIS — Z23 NEED FOR DIPHTHERIA-TETANUS-PERTUSSIS (TDAP) VACCINE: ICD-10-CM

## 2023-07-05 DIAGNOSIS — E11.9 TYPE 2 DIABETES MELLITUS WITHOUT COMPLICATION, WITHOUT LONG-TERM CURRENT USE OF INSULIN: Primary | ICD-10-CM

## 2023-07-05 DIAGNOSIS — E03.9 HYPOTHYROIDISM, UNSPECIFIED TYPE: ICD-10-CM

## 2023-07-05 PROBLEM — E66.9 CLASS 1 OBESITY WITH SERIOUS COMORBIDITY AND BODY MASS INDEX (BMI) OF 30.0 TO 30.9 IN ADULT: Status: RESOLVED | Noted: 2022-10-25 | Resolved: 2023-07-05

## 2023-07-05 PROBLEM — S91.319A LACERATION OF FOOT: Status: ACTIVE | Noted: 2023-07-05

## 2023-07-05 PROCEDURE — 90471 TDAP VACCINE GREATER THAN OR EQUAL TO 7YO IM: ICD-10-PCS | Mod: ,,, | Performed by: FAMILY MEDICINE

## 2023-07-05 PROCEDURE — 3078F PR MOST RECENT DIASTOLIC BLOOD PRESSURE < 80 MM HG: ICD-10-PCS | Mod: CPTII,,, | Performed by: FAMILY MEDICINE

## 2023-07-05 PROCEDURE — 99214 OFFICE O/P EST MOD 30 MIN: CPT | Mod: 25,,, | Performed by: FAMILY MEDICINE

## 2023-07-05 PROCEDURE — 3074F SYST BP LT 130 MM HG: CPT | Mod: CPTII,,, | Performed by: FAMILY MEDICINE

## 2023-07-05 PROCEDURE — 1159F MED LIST DOCD IN RCRD: CPT | Mod: CPTII,,, | Performed by: FAMILY MEDICINE

## 2023-07-05 PROCEDURE — 3078F DIAST BP <80 MM HG: CPT | Mod: CPTII,,, | Performed by: FAMILY MEDICINE

## 2023-07-05 PROCEDURE — 1160F RVW MEDS BY RX/DR IN RCRD: CPT | Mod: CPTII,,, | Performed by: FAMILY MEDICINE

## 2023-07-05 PROCEDURE — 3008F PR BODY MASS INDEX (BMI) DOCUMENTED: ICD-10-PCS | Mod: CPTII,,, | Performed by: FAMILY MEDICINE

## 2023-07-05 PROCEDURE — 4010F ACE/ARB THERAPY RXD/TAKEN: CPT | Mod: CPTII,,, | Performed by: FAMILY MEDICINE

## 2023-07-05 PROCEDURE — 3044F PR MOST RECENT HEMOGLOBIN A1C LEVEL <7.0%: ICD-10-PCS | Mod: CPTII,,, | Performed by: FAMILY MEDICINE

## 2023-07-05 PROCEDURE — 99214 PR OFFICE/OUTPT VISIT, EST, LEVL IV, 30-39 MIN: ICD-10-PCS | Mod: 25,,, | Performed by: FAMILY MEDICINE

## 2023-07-05 PROCEDURE — 90715 TDAP VACCINE 7 YRS/> IM: CPT | Mod: ,,, | Performed by: FAMILY MEDICINE

## 2023-07-05 PROCEDURE — 3008F BODY MASS INDEX DOCD: CPT | Mod: CPTII,,, | Performed by: FAMILY MEDICINE

## 2023-07-05 PROCEDURE — 3074F PR MOST RECENT SYSTOLIC BLOOD PRESSURE < 130 MM HG: ICD-10-PCS | Mod: CPTII,,, | Performed by: FAMILY MEDICINE

## 2023-07-05 PROCEDURE — 1159F PR MEDICATION LIST DOCUMENTED IN MEDICAL RECORD: ICD-10-PCS | Mod: CPTII,,, | Performed by: FAMILY MEDICINE

## 2023-07-05 PROCEDURE — 4010F PR ACE/ARB THEARPY RXD/TAKEN: ICD-10-PCS | Mod: CPTII,,, | Performed by: FAMILY MEDICINE

## 2023-07-05 PROCEDURE — 1160F PR REVIEW ALL MEDS BY PRESCRIBER/CLIN PHARMACIST DOCUMENTED: ICD-10-PCS | Mod: CPTII,,, | Performed by: FAMILY MEDICINE

## 2023-07-05 PROCEDURE — 90715 TDAP VACCINE GREATER THAN OR EQUAL TO 7YO IM: ICD-10-PCS | Mod: ,,, | Performed by: FAMILY MEDICINE

## 2023-07-05 PROCEDURE — 90471 IMMUNIZATION ADMIN: CPT | Mod: ,,, | Performed by: FAMILY MEDICINE

## 2023-07-05 PROCEDURE — 3044F HG A1C LEVEL LT 7.0%: CPT | Mod: CPTII,,, | Performed by: FAMILY MEDICINE

## 2023-07-05 RX ORDER — HYDROCHLOROTHIAZIDE 25 MG/1
25 TABLET ORAL DAILY
Qty: 90 TABLET | Refills: 3 | Status: SHIPPED | OUTPATIENT
Start: 2023-07-05 | End: 2024-07-04

## 2023-07-05 RX ORDER — PAROXETINE 30 MG/1
30 TABLET, FILM COATED ORAL DAILY
Qty: 90 TABLET | Refills: 3 | Status: SHIPPED | OUTPATIENT
Start: 2023-07-05 | End: 2023-12-15 | Stop reason: SDUPTHER

## 2023-07-05 RX ORDER — SULFAMETHOXAZOLE AND TRIMETHOPRIM 800; 160 MG/1; MG/1
1 TABLET ORAL 2 TIMES DAILY
Qty: 14 TABLET | Refills: 0 | Status: SHIPPED | OUTPATIENT
Start: 2023-07-05 | End: 2023-07-12

## 2023-07-05 NOTE — ASSESSMENT & PLAN NOTE
On paxil 20mg. Asking to increase. rx for paxil 30mg sent in. Asking for referral to counselor. Referral placed as requested.

## 2023-07-05 NOTE — ASSESSMENT & PLAN NOTE
Lab Results   Component Value Date    TSH 2.070 02/27/2023     On synthroid 75mcg. Repeat labs in. Will adjust meds if needed based on labs

## 2023-07-05 NOTE — PROGRESS NOTES
Subjective:        Patient ID: Cookie Membreno is a 53 y.o. female.    Chief Complaint: Follow-up (Follow up/Antibiotics for scratch on foot/Increase dosage monjauro and synthroid ([facility] [place] [facility] [address] ))      Patient presents to clinic unaccompanied for follow up.  She is due for her wellness visit in October.   She is due for labs.     She is obese. Her stepson got  4/2023 and would like to lose weight before then.  She was previously on belviq which worked but was too expensive.  We started adipex at her lov 10/2022 x 2 months but has not lost weight.  We also tried synthroid due to new dx of hypothyroidism but has not lost weight.  Is exercising and eating well.  Would like to try another med if possible. Insurance would not cover wegovy or mounjaro so we sent in adipex again until we could find a medication.   At her visit 2/2023, She has lost 5#. She is on wegovy but had to pay out of pocket ($1400 for 1 month supply) because insurance would not cover- got on tuesday.  Exercising. Eating better.  We repeated labs and confirmed dx of diabetes.  So we sent in mounjaro.      At her follow up on 3/27/23, she had lost 8# since lov. Feels like she has a waist now.  Still working hard on her diet and exercise. Has done 2 shots of mounjaro 2.5mg.  tolerating well.  Asking to continue on adipex as well.       At her follow up on 4/22/23, - Had wedding this past weekend.  She has lost 13# since lov.  Feeling good.  Would like to continue adipex for 1 more month to hopefully reach her goal weight of 155#. On mounjaro 2.5mg.    She is here today for follow up.  On mounjaro 7.5mg weekly. Just took 1st dose this week.  Needs a refill and [facility]. Has been off of adipex    C/o cut on her right foot.   Was very painful and swollen.  Is better.  Still painful when bearing weight.  Not sure when last tdap was. Would like to update it today    C/o  "increase in stress/anxiousness.  On paxil 20mg.  Asking to increase. New rx for 30mg sent in. Also asking for referral to therapist. Referral sent.          She is diabetic.  A1c was 6.4 2/2023. Urine micro ordered. Foot exam 4/2023. Eye exam in office 4/2023.  On mounjaro 7.5mg.  Cbgs 136 max.     Has hypothyroidism. On synthroid 75mcg daily.  Tsh 2/2023 wnl.  Aletha recently dx with thyroid cancer.       She has  HTN and HLD. Reports compliance with her meds.  Lisinopril caused cough.  Monitors her bp at home.      She has personal history of breast cancer dx 2015.  She had bilateral mastectomy and complete hysterectomy and bso.  She was on tamoxifen and had breast reconstruction.  After her hyst, dr. Salazar prescribed paxil for her hot flashes.  She follows with dr. Patino.     She reported increased anxiety/depression/stress/insomnia at her visit 2/2022.  Her older sister who had breast cancer passed and a younger brother was dx with sarcoidosis.  So we increased paxil to 20mg daily. Xanax was sent in prn panic attacks. Doing well.     She had a positive cologuard 6/2021 and had subsequent colonoscopy 7/2021 with . Asher loco     Had infected olecranon bursa.  Required surgery.  Doing much better now.      She drinks alcohol on occasion.  She declines immunizations    Review of Systems   Constitutional: Negative.    HENT: Negative.     Respiratory: Negative.     Cardiovascular: Negative.    Gastrointestinal: Negative.    Genitourinary: Negative.    Psychiatric/Behavioral:  The patient is nervous/anxious.        Review of patient's allergies indicates:   Allergen Reactions    Cephalexin Hives, Itching and Shortness Of Breath    Hydrocodone     Propoxyphene n-acetaminophen       Vitals:    07/05/23 1126   BP: 113/71   BP Location: Right arm   Patient Position: Sitting   BP Method: Small (Automatic)   Pulse: 93   Resp: 18   SpO2: 98%   Weight: 74.4 kg (164 lb 1.6 oz)   Height: 5' 4" (1.626 m)      Social " History     Socioeconomic History    Marital status:    Tobacco Use    Smoking status: Former     Types: Cigarettes     Quit date: 2017     Years since quittin.5    Smokeless tobacco: Never   Substance and Sexual Activity    Alcohol use: Yes     Alcohol/week: 1.0 standard drink     Types: 1 Drinks containing 0.5 oz of alcohol per week    Drug use: Never    Sexual activity: Yes     Partners: Male     Birth control/protection: None      History reviewed. No pertinent family history.       Objective:     Physical Exam  Vitals and nursing note reviewed.   Constitutional:       Appearance: Normal appearance. She is overweight.   HENT:      Head: Normocephalic and atraumatic.      Nose: Nose normal.      Mouth/Throat:      Mouth: Mucous membranes are moist.      Pharynx: Oropharynx is clear.   Eyes:      Extraocular Movements: Extraocular movements intact.   Cardiovascular:      Rate and Rhythm: Normal rate and regular rhythm.      Pulses: Normal pulses.      Heart sounds: Normal heart sounds.   Pulmonary:      Effort: Pulmonary effort is normal.      Breath sounds: Normal breath sounds.   Musculoskeletal:         General: Normal range of motion.      Cervical back: Normal range of motion.        Feet:    Feet:      Comments: Healing laceration approx 1cm linear. No surrounding erythema or increased warmth  Skin:     General: Skin is warm and dry.   Neurological:      General: No focal deficit present.      Mental Status: She is alert and oriented to person, place, and time. Mental status is at baseline.   Psychiatric:         Mood and Affect: Mood normal.     Current Outpatient Medications on File Prior to Visit   Medication Sig Dispense Refill    blood sugar diagnostic Strp To check BG two times daily, to use with insurance preferred meter 100 each 11    blood-glucose meter kit To check BG two times daily, to use with insurance preferred meter 1 each 0    lancets Misc To check BG two times daily, to use  with insurance preferred meter 100 each 11    levothyroxine (SYNTHROID) 75 MCG tablet Take 1 tablet (75 mcg total) by mouth before breakfast. 30 tablet 11    ondansetron (ZOFRAN) 4 MG tablet Take 1 tablet (4 mg total) by mouth every 12 (twelve) hours as needed for Nausea. 15 tablet 1    TRUE METRIX GLUCOSE METER Misc USE TO check blood glucose      TRUEPLUS LANCETS 28 gauge Misc Apply topically 2 (two) times daily.      [DISCONTINUED] hydroCHLOROthiazide (HYDRODIURIL) 25 MG tablet Take 1 tablet (25 mg total) by mouth once daily. 90 tablet 3    [DISCONTINUED] paroxetine (PAXIL) 20 MG tablet Take 1 tablet (20 mg total) by mouth once daily. 90 tablet 3    [DISCONTINUED] tirzepatide 7.5 mg/0.5 mL PnIj Inject 7.5 mg into the skin every 7 days. 4 pen 11    losartan (COZAAR) 25 MG tablet Take 1 tablet (25 mg total) by mouth once daily. 90 tablet 3    pantoprazole (PROTONIX) 40 MG tablet Take 40 mg by mouth.      pravastatin (PRAVACHOL) 40 MG tablet Take 1 tablet (40 mg total) by mouth once daily. 90 tablet 3     No current facility-administered medications on file prior to visit.     Health Maintenance   Topic Date Due    Low Dose Statin  Never done    Hemoglobin A1c  08/27/2023    Lipid Panel  11/01/2023    Foot Exam  04/24/2024    Eye Exam  04/28/2024    TETANUS VACCINE  07/05/2033    Hepatitis C Screening  Completed      Results for orders placed or performed in visit on 02/27/23   Hgb A1c w/ eAG Estimation   Result Value Ref Range    Hemoglobin A1c 6.4 (H) 4.8 - 5.6 %    Estimated Avg Glucose 137 mg/dL   T4, Free   Result Value Ref Range    T4, Free 1.67 0.82 - 1.77 ng/dL   TSH   Result Value Ref Range    TSH 2.070 0.450 - 4.500 uIU/mL   Thyroid Peroxidase Antibody   Result Value Ref Range    Thyroid Peroxidase Ab 193 (H) 0 - 34 IU/mL   T3, Free   Result Value Ref Range    T3, Free 2.9 2.0 - 4.4 pg/mL          Assessment & Plan:     Active Problem List with Overview Notes    Diagnosis Date Noted    Overweight (BMI  25.0-29.9) 07/05/2023    Laceration of foot 07/05/2023    Need for diphtheria-tetanus-pertussis (Tdap) vaccine 07/05/2023    Anxious mood 07/05/2023    Diabetes 03/27/2023    Hypothyroid 12/28/2022    Family history of systemic lupus erythematosus 12/28/2022    History of breast cancer 10/25/2022    H/O bilateral mastectomy 10/25/2022    Family history of diabetes mellitus 10/25/2022    Colon cancer screening 10/25/2022    Hyperlipidemia associated with type 2 diabetes mellitus 10/25/2022    Hypertension associated with diabetes 10/25/2022    Insomnia 10/25/2022    Seasonal allergic rhinitis 10/25/2022    Tobacco user 10/25/2022    Stomach ulcer        1. Type 2 diabetes mellitus without complication, without long-term current use of insulin  Assessment & Plan:  Lab Results   Component Value Date    HGBA1C 6.4 (H) 02/27/2023     Urine micro ordered  Foot exam -4/2023  Eye exam in office 4/2023    On mounjaro, statin, arb. Lisinopril caused cough.      Due for repeat labs. Orders in.  Will go do today    Orders:  -     tirzepatide 10 mg/0.5 mL PnIj; Inject 10 mg into the skin every 7 days.  Dispense: 4 pen; Refill: 0    2. Hypertension associated with diabetes  Assessment & Plan:  Well controlled on current prescription meds. Refill sent in.  Not on asa due to GI      3. Overweight (BMI 25.0-29.9)  Assessment & Plan:  Encouraged continued lifestyle change      4. Laceration of right foot, initial encounter  Assessment & Plan:  Will update tdap today. Will send in antibiotics. Take as directed. Monitor closely. Contact clinic for concerns.     Orders:  -     sulfamethoxazole-trimethoprim 800-160mg (BACTRIM DS) 800-160 mg Tab; Take 1 tablet by mouth 2 (two) times daily. for 7 days  Dispense: 14 tablet; Refill: 0  -     Tdap Vaccine    5. Need for diphtheria-tetanus-pertussis (Tdap) vaccine  Assessment & Plan:  tdap today    Orders:  -     Tdap Vaccine    6. Anxious mood  Assessment & Plan:  On paxil 20mg. Asking to  increase. rx for paxil 30mg sent in. Asking for referral to counselor. Referral placed as requested.     Orders:  -     Ambulatory referral/consult to Psychology; Future; Expected date: 07/12/2023    7. Hypothyroidism, unspecified type  Assessment & Plan:  Lab Results   Component Value Date    TSH 2.070 02/27/2023     On synthroid 75mcg. Repeat labs in. Will adjust meds if needed based on labs      Other orders  -     paroxetine (PAXIL) 30 MG tablet; Take 1 tablet (30 mg total) by mouth once daily.  Dispense: 90 tablet; Refill: 3  -     hydroCHLOROthiazide (HYDRODIURIL) 25 MG tablet; Take 1 tablet (25 mg total) by mouth once daily.  Dispense: 90 tablet; Refill: 3         Follow up in about 3 months (around 10/5/2023) for Wellness with Labs.

## 2023-07-05 NOTE — ASSESSMENT & PLAN NOTE
Will update tdap today. Will send in antibiotics. Take as directed. Monitor closely. Contact clinic for concerns.

## 2023-07-05 NOTE — PROGRESS NOTES
Pt in office for f/u. Pt received TDAP Vaccine in the right deltoid. Pt tolerated well with no reaction.

## 2023-07-05 NOTE — ASSESSMENT & PLAN NOTE
Lab Results   Component Value Date    HGBA1C 6.4 (H) 02/27/2023     Urine micro ordered  Foot exam -4/2023  Eye exam in office 4/2023    On mounjaro, statin, arb. Lisinopril caused cough.      Due for repeat labs. Orders in.  Will go do today

## 2023-08-02 ENCOUNTER — TELEPHONE (OUTPATIENT)
Dept: FAMILY MEDICINE | Facility: CLINIC | Age: 53
End: 2023-08-02
Payer: COMMERCIAL

## 2023-08-02 NOTE — TELEPHONE ENCOUNTER
----- Message from Maribel Woo sent at 8/2/2023  2:13 PM CDT -----  Regarding: Referral  .Type:  Needs Medical Advice    Who Called: Pt  Symptoms (please be specific):    How long has patient had these symptoms:    Pharmacy name and phone #:    Would the patient rather a call back or a response via [facility]? Call back  Best Call Back Number: [phone]  Additional Information: Pt requesting a referral to a physicist as close to [place].

## 2023-08-15 ENCOUNTER — TELEPHONE (OUTPATIENT)
Dept: FAMILY MEDICINE | Facility: CLINIC | Age: 53
End: 2023-08-15
Payer: COMMERCIAL

## 2023-08-15 DIAGNOSIS — E11.59 HYPERTENSION ASSOCIATED WITH DIABETES: ICD-10-CM

## 2023-08-15 DIAGNOSIS — E78.5 HYPERLIPIDEMIA ASSOCIATED WITH TYPE 2 DIABETES MELLITUS: ICD-10-CM

## 2023-08-15 DIAGNOSIS — I15.2 HYPERTENSION ASSOCIATED WITH DIABETES: ICD-10-CM

## 2023-08-15 DIAGNOSIS — E11.69 HYPERLIPIDEMIA ASSOCIATED WITH TYPE 2 DIABETES MELLITUS: ICD-10-CM

## 2023-08-15 DIAGNOSIS — E03.9 HYPOTHYROIDISM, UNSPECIFIED TYPE: ICD-10-CM

## 2023-08-15 DIAGNOSIS — E11.9 TYPE 2 DIABETES MELLITUS WITHOUT COMPLICATION, WITHOUT LONG-TERM CURRENT USE OF INSULIN: Primary | ICD-10-CM

## 2023-08-15 RX ORDER — TIRZEPATIDE 12.5 MG/.5ML
12.5 INJECTION, SOLUTION SUBCUTANEOUS
Qty: 4 PEN | Refills: 2 | Status: SHIPPED | OUTPATIENT
Start: 2023-08-15 | End: 2023-10-11 | Stop reason: SDUPTHER

## 2023-08-15 NOTE — TELEPHONE ENCOUNTER
I have signed for the following orders AND/OR meds.  Please call the patient and ask the patient to schedule the testing AND/OR inform about any medications that were sent.      Orders Placed This Encounter   Procedures    Urinalysis, Reflex to Urine Culture     Standing Status:   Future     Standing Expiration Date:   10/13/2024     Order Specific Question:   Specimen Source     Answer:   Urine    Microalbumin/Creatinine Ratio, Urine           Standing Status:   Future     Standing Expiration Date:   10/13/2024     Order Specific Question:   Specimen Source     Answer:   Urine       Medications Ordered This Encounter   Medications    tirzepatide (MOUNJARO) 12.5 mg/0.5 mL PnIj     Sig: Inject 12.5 mg into the skin every 7 days.     Dispense:  4 pen      Refill:  2

## 2023-08-15 NOTE — TELEPHONE ENCOUNTER
Patient requesting new script for 12 mg mounjaro. Has been on 10 mg for minimum of 4 weeks, would like to increase.

## 2023-08-25 LAB
ALBUMIN/CREAT UR: <13 MG/G CREAT (ref 0–29)
APPEARANCE UR: CLEAR
BACTERIA #/AREA URNS HPF: ABNORMAL /[HPF]
BACTERIA UR CULT: NORMAL
BACTERIA UR CULT: NORMAL
BILIRUB UR QL STRIP: NEGATIVE
COLOR UR: YELLOW
CREAT UR-MCNC: 89.5 MG/DL
CRYSTALS URNS MICRO: ABNORMAL
EPI CELLS #/AREA URNS HPF: >10 /HPF (ref 0–10)
GLUCOSE UR QL STRIP: NEGATIVE
HGB UR QL STRIP: NEGATIVE
KETONES UR QL STRIP: NEGATIVE
LEUKOCYTE ESTERASE UR QL STRIP: ABNORMAL
MICRO URNS: ABNORMAL
MICROALBUMIN UR-MCNC: <12 UG/ML
MUCOUS THREADS URNS QL MICRO: PRESENT
NITRITE UR QL STRIP: NEGATIVE
PH UR STRIP: 5.5 [PH] (ref 5–7.5)
PROT UR QL STRIP: NEGATIVE
RBC #/AREA URNS HPF: ABNORMAL /HPF (ref 0–2)
SP GR UR STRIP: 1.02 (ref 1–1.03)
URINALYSIS REFLEX: ABNORMAL
UROBILINOGEN UR STRIP-MCNC: 0.2 MG/DL (ref 0.2–1)
WBC #/AREA URNS HPF: ABNORMAL /HPF (ref 0–5)

## 2023-08-28 ENCOUNTER — TELEPHONE (OUTPATIENT)
Dept: FAMILY MEDICINE | Facility: CLINIC | Age: 53
End: 2023-08-28
Payer: COMMERCIAL

## 2023-08-28 NOTE — PROGRESS NOTES
Please inform patient of results.  Keep scheduled appt 10/9/23. Can discuss more at appt    1. Ua had leukocytes but likely contaminant of skin cells.  Is she having any uti symptoms? Final culture was negative for infection      Other labwork within acceptable ranges.

## 2023-08-28 NOTE — TELEPHONE ENCOUNTER
----- Message from April Levin MD sent at 8/28/2023 12:54 PM CDT -----  Please inform patient of results.  Keep scheduled appt 10/9/23. Can discuss more at appt    1. Ua had leukocytes but likely contaminant of skin cells.  Is she having any uti symptoms? Final culture was negative for infection      Other labwork within acceptable ranges.

## 2023-09-14 ENCOUNTER — TELEPHONE (OUTPATIENT)
Dept: FAMILY MEDICINE | Facility: CLINIC | Age: 53
End: 2023-09-14
Payer: COMMERCIAL

## 2023-09-14 NOTE — TELEPHONE ENCOUNTER
----- Message from More Acosta sent at 9/14/2023 10:09 AM CDT -----  Regarding: refill  Type:  RX Refill Request    Who Called: pt  Refill or New Rx:refill  RX Name and Strength:phentermine (ADIPEX-P) 37.5 mg tablet  How is the patient currently taking it? (ex. 1XDay):1 day  Is this a 30 day or 90 day RX:30  Preferred Pharmacy with phone number:[facility] in [place]  Local or Mail Order:local  Ordering Provider:dr alcaraz  Would the patient rather a call back or a response via [facility]? C/b  Best Call Back Number:[phone]    Additional Information: pt requested medication for 1 month just to get her back on track.  Pt is having a hard time with divorce and money .    Thank you

## 2023-09-14 NOTE — TELEPHONE ENCOUNTER
Yes she would need appt to get accurate/current weight before prescribing adipex since it is a controlled medication

## 2023-09-15 ENCOUNTER — TELEPHONE (OUTPATIENT)
Dept: FAMILY MEDICINE | Facility: CLINIC | Age: 53
End: 2023-09-15
Payer: COMMERCIAL

## 2023-10-11 DIAGNOSIS — E11.9 TYPE 2 DIABETES MELLITUS WITHOUT COMPLICATION, WITHOUT LONG-TERM CURRENT USE OF INSULIN: ICD-10-CM

## 2023-10-11 RX ORDER — TIRZEPATIDE 12.5 MG/.5ML
12.5 INJECTION, SOLUTION SUBCUTANEOUS
Qty: 4 PEN | Refills: 2 | Status: SHIPPED | OUTPATIENT
Start: 2023-10-11 | End: 2023-11-07 | Stop reason: SDUPTHER

## 2023-10-17 ENCOUNTER — PATIENT MESSAGE (OUTPATIENT)
Dept: FAMILY MEDICINE | Facility: CLINIC | Age: 53
End: 2023-10-17
Payer: COMMERCIAL

## 2023-11-07 DIAGNOSIS — E11.9 TYPE 2 DIABETES MELLITUS WITHOUT COMPLICATION, WITHOUT LONG-TERM CURRENT USE OF INSULIN: ICD-10-CM

## 2023-11-07 RX ORDER — TIRZEPATIDE 12.5 MG/.5ML
12.5 INJECTION, SOLUTION SUBCUTANEOUS
Qty: 4 PEN | Refills: 2 | Status: SHIPPED | OUTPATIENT
Start: 2023-11-07 | End: 2023-12-26 | Stop reason: SDUPTHER

## 2023-12-15 RX ORDER — LOSARTAN POTASSIUM 25 MG/1
25 TABLET ORAL DAILY
Qty: 90 TABLET | Refills: 3 | Status: SHIPPED | OUTPATIENT
Start: 2023-12-15 | End: 2024-12-09

## 2023-12-15 RX ORDER — PAROXETINE 30 MG/1
30 TABLET, FILM COATED ORAL DAILY
Qty: 90 TABLET | Refills: 3 | Status: SHIPPED | OUTPATIENT
Start: 2023-12-15 | End: 2024-01-16 | Stop reason: SDUPTHER

## 2023-12-26 DIAGNOSIS — E11.9 TYPE 2 DIABETES MELLITUS WITHOUT COMPLICATION, WITHOUT LONG-TERM CURRENT USE OF INSULIN: ICD-10-CM

## 2023-12-27 RX ORDER — TIRZEPATIDE 12.5 MG/.5ML
12.5 INJECTION, SOLUTION SUBCUTANEOUS
Qty: 4 PEN | Refills: 2 | Status: SHIPPED | OUTPATIENT
Start: 2023-12-27 | End: 2024-02-19 | Stop reason: SDUPTHER

## 2024-01-16 DIAGNOSIS — E11.9 TYPE 2 DIABETES MELLITUS WITHOUT COMPLICATION, WITHOUT LONG-TERM CURRENT USE OF INSULIN: ICD-10-CM

## 2024-01-16 DIAGNOSIS — E11.9 TYPE 2 DIABETES MELLITUS WITHOUT COMPLICATION, WITHOUT LONG-TERM CURRENT USE OF INSULIN: Primary | ICD-10-CM

## 2024-01-16 RX ORDER — PAROXETINE 30 MG/1
30 TABLET, FILM COATED ORAL DAILY
Qty: 90 TABLET | Refills: 3 | Status: SHIPPED | OUTPATIENT
Start: 2024-01-16 | End: 2025-01-15

## 2024-01-16 RX ORDER — TIRZEPATIDE 12.5 MG/.5ML
12.5 INJECTION, SOLUTION SUBCUTANEOUS
Qty: 4 PEN | Refills: 2 | Status: CANCELLED | OUTPATIENT
Start: 2024-01-16 | End: 2024-04-15

## 2024-01-17 ENCOUNTER — TELEPHONE (OUTPATIENT)
Dept: FAMILY MEDICINE | Facility: CLINIC | Age: 54
End: 2024-01-17
Payer: COMMERCIAL

## 2024-01-29 ENCOUNTER — TELEPHONE (OUTPATIENT)
Dept: FAMILY MEDICINE | Facility: CLINIC | Age: 54
End: 2024-01-29
Payer: COMMERCIAL

## 2024-01-29 NOTE — TELEPHONE ENCOUNTER
----- Message from Gilmar Ley sent at 1/29/2024  4:02 PM CST -----  .Type:  Needs Medical Advice    Who Called: pt  Symptoms (please be specific):    How long has patient had these symptoms:    Pharmacy name and phone #:    Would the patient rather a call back or a response via [facility]? Call back  Best Call Back Number: [phone]   Additional Information: pt calling to speak with Kait to discuss prior authorization appeal, pt is requesting lab orders via mail

## 2024-01-29 NOTE — TELEPHONE ENCOUNTER
I returned the patient's call with phone number listed on message. Spoke with the patient's friend dahiana. I notified her that her appeal was file 01/17/24 as previously discussed via portal message. Also also notified her that as previously discussed, appeals can take weeks to process and I have not yet received anything from her insurance. I did advise her to tell the patient that if she is symptomatic she needs to make an appointment to discuss a possible alternative medication in the meantime.   Labs printed, I verified address with patient's friend, dahiana and placed in mail today.

## 2024-01-30 ENCOUNTER — TELEPHONE (OUTPATIENT)
Dept: FAMILY MEDICINE | Facility: CLINIC | Age: 54
End: 2024-01-30
Payer: COMMERCIAL

## 2024-01-30 DIAGNOSIS — E03.9 HYPOTHYROIDISM, UNSPECIFIED TYPE: ICD-10-CM

## 2024-01-30 RX ORDER — LEVOTHYROXINE SODIUM 75 UG/1
75 TABLET ORAL
Qty: 30 TABLET | Refills: 11 | Status: SHIPPED | OUTPATIENT
Start: 2024-01-30 | End: 2025-01-29

## 2024-02-02 ENCOUNTER — TELEPHONE (OUTPATIENT)
Dept: FAMILY MEDICINE | Facility: CLINIC | Age: 54
End: 2024-02-02
Payer: COMMERCIAL

## 2024-02-02 NOTE — TELEPHONE ENCOUNTER
Spoke with patient and informed that information is being faxed over to her insurance for her medication now

## 2024-02-02 NOTE — TELEPHONE ENCOUNTER
----- Message from Talita Arguello sent at 2/2/2024  8:57 AM CST -----  Regarding: med  .Type:  Needs Medical Advice    Who Called: pt  Would the patient rather a call back or a response via [facility]?   Best Call Back Number: [phone]  Additional Information: prior auth needed

## 2024-02-06 ENCOUNTER — TELEPHONE (OUTPATIENT)
Dept: FAMILY MEDICINE | Facility: CLINIC | Age: 54
End: 2024-02-06
Payer: COMMERCIAL

## 2024-02-19 DIAGNOSIS — E11.9 TYPE 2 DIABETES MELLITUS WITHOUT COMPLICATION, WITHOUT LONG-TERM CURRENT USE OF INSULIN: ICD-10-CM

## 2024-02-19 RX ORDER — TIRZEPATIDE 12.5 MG/.5ML
12.5 INJECTION, SOLUTION SUBCUTANEOUS
Qty: 12 PEN | Refills: 0 | Status: SHIPPED | OUTPATIENT
Start: 2024-02-19 | End: 2024-05-19

## 2024-04-01 PROBLEM — Z00.00 WELLNESS EXAMINATION: Status: RESOLVED | Noted: 2022-10-25 | Resolved: 2024-04-01

## 2024-04-04 ENCOUNTER — TELEPHONE (OUTPATIENT)
Dept: FAMILY MEDICINE | Facility: CLINIC | Age: 54
End: 2024-04-04
Payer: COMMERCIAL

## 2024-04-04 NOTE — TELEPHONE ENCOUNTER
Double checking with you to verify legitimacy of this call. Patient has not been seen since July 2023,  and has not been prescribed ozempic by a provider in our office

## 2024-04-08 DIAGNOSIS — E66.01 CLASS 2 SEVERE OBESITY WITH SERIOUS COMORBIDITY AND BODY MASS INDEX (BMI) OF 35.0 TO 35.9 IN ADULT, UNSPECIFIED OBESITY TYPE: ICD-10-CM

## 2024-04-08 DIAGNOSIS — E11.9 TYPE 2 DIABETES MELLITUS WITHOUT COMPLICATION, WITHOUT LONG-TERM CURRENT USE OF INSULIN: ICD-10-CM

## 2024-04-08 DIAGNOSIS — E11.65 TYPE 2 DIABETES MELLITUS WITH HYPERGLYCEMIA, WITHOUT LONG-TERM CURRENT USE OF INSULIN: ICD-10-CM

## 2024-04-08 RX ORDER — GLUCOSAM/CHON-MSM1/C/MANG/BOSW 500-416.6
1 TABLET ORAL 2 TIMES DAILY
Qty: 200 EACH | Refills: 6 | Status: SHIPPED | OUTPATIENT
Start: 2024-04-08

## 2024-06-21 ENCOUNTER — TELEPHONE (OUTPATIENT)
Dept: FAMILY MEDICINE | Facility: CLINIC | Age: 54
End: 2024-06-21
Payer: COMMERCIAL

## 2024-06-21 NOTE — TELEPHONE ENCOUNTER
Patient noticed that our office does not take medicaid as primary insurance, and that we are unable to submit prior authorizations for medicaid patients due to this. She verbalized understanding. I did recommend that the patient try finding a provider that does accept medicaid as primary insurance so that prior authorizations can be completed. Patient verbalized understanding

## 2024-06-21 NOTE — TELEPHONE ENCOUNTER
----- Message from Linh Degroot sent at 6/21/2024 10:35 AM CDT -----  Type:  Needs Medical Advice    Who Called: pt   Symptoms (please be specific):    How long has patient had these symptoms:      Pharmacy name and phone #:  [facility] in [place]   Would the patient rather a call back or a response via [facility]?   Best Call Back Number: [phone]    Additional Information: pt called to determine if PA was sent to insurance provider for , tirzepatide (MOUNJARO) 12.5 mg/0.5 mL PnIj, stated she has not had medication in months , pt now has Medicaid ( insurance had been updated) .   Asked for a follow up call